# Patient Record
Sex: MALE | Race: WHITE | Employment: FULL TIME | URBAN - METROPOLITAN AREA
[De-identification: names, ages, dates, MRNs, and addresses within clinical notes are randomized per-mention and may not be internally consistent; named-entity substitution may affect disease eponyms.]

---

## 2017-01-05 RX ORDER — INSULIN LISPRO 100 [IU]/ML
INJECTION, SOLUTION INTRAVENOUS; SUBCUTANEOUS
Qty: 30 ML | Refills: 0 | Status: SHIPPED | OUTPATIENT
Start: 2017-01-05 | End: 2017-02-26

## 2017-01-10 ENCOUNTER — TELEPHONE (OUTPATIENT)
Dept: ENDOCRINOLOGY CLINIC | Facility: CLINIC | Age: 32
End: 2017-01-10

## 2017-01-10 NOTE — TELEPHONE ENCOUNTER
Kinga Delcid requesting RN to fax last 2 office notes to [phone]. For add'l questions pls call. Thank you.

## 2017-01-30 ENCOUNTER — TELEPHONE (OUTPATIENT)
Dept: INTERNAL MEDICINE CLINIC | Facility: CLINIC | Age: 32
End: 2017-01-30

## 2017-01-30 NOTE — TELEPHONE ENCOUNTER
Patient had colonoscopy done 7/14/2014 with Dr. Yara Singletary. Recommended to repeat in two years due to family hx (see Dr. Rashida Woods letter to Dr. Tracie Friedman in Media tab). Called patient and relayed message. Verbalized understanding.

## 2017-01-30 NOTE — TELEPHONE ENCOUNTER
Pt had a colonoscopy a couple of years ago, and he doesn't remember who he saw.                Tasked to Nursing

## 2017-01-30 NOTE — TELEPHONE ENCOUNTER
Dr. Bernard Lee, patient is curious about the heart scan he see's advertised through [employer]. He states he wants to be proactive due to his diabetes and is wondering if you thought it'd be ok if he does the testing.  I believe the test includes heart scan to see

## 2017-02-27 RX ORDER — INSULIN LISPRO 100 [IU]/ML
INJECTION, SOLUTION INTRAVENOUS; SUBCUTANEOUS
Qty: 30 ML | Refills: 0 | Status: SHIPPED | OUTPATIENT
Start: 2017-02-27 | End: 2017-04-22

## 2017-03-07 PROBLEM — Z83.719 FAMILY HISTORY OF COLONIC POLYPS: Status: ACTIVE | Noted: 2017-03-07

## 2017-03-07 PROBLEM — Z83.71 FAMILY HISTORY OF COLONIC POLYPS: Status: ACTIVE | Noted: 2017-03-07

## 2017-03-07 PROBLEM — Z80.0 FAMILY HISTORY OF COLON CANCER: Status: ACTIVE | Noted: 2017-03-07

## 2017-03-24 ENCOUNTER — HOSPITAL ENCOUNTER (OUTPATIENT)
Dept: CT IMAGING | Facility: HOSPITAL | Age: 32
Discharge: HOME | End: 2017-03-24
Attending: INTERNAL MEDICINE

## 2017-03-24 VITALS — HEART RATE: 82 BPM | SYSTOLIC BLOOD PRESSURE: 118 MMHG | DIASTOLIC BLOOD PRESSURE: 72 MMHG

## 2017-03-24 DIAGNOSIS — Z13.9 SCREENING FOR CONDITION: ICD-10-CM

## 2017-03-30 ENCOUNTER — TELEPHONE (OUTPATIENT)
Dept: INTERNAL MEDICINE CLINIC | Facility: CLINIC | Age: 32
End: 2017-03-30

## 2017-04-24 RX ORDER — INSULIN LISPRO 100 [IU]/ML
INJECTION, SOLUTION INTRAVENOUS; SUBCUTANEOUS
Qty: 30 ML | Refills: 0 | Status: SHIPPED | OUTPATIENT
Start: 2017-04-24 | End: 2017-05-31

## 2017-05-31 RX ORDER — INSULIN LISPRO 100 [IU]/ML
INJECTION, SOLUTION INTRAVENOUS; SUBCUTANEOUS
Qty: 30 ML | Refills: 0 | OUTPATIENT
Start: 2017-05-31

## 2017-05-31 RX ORDER — INSULIN LISPRO 100 [IU]/ML
INJECTION, SOLUTION INTRAVENOUS; SUBCUTANEOUS
Qty: 30 ML | Refills: 2 | Status: SHIPPED | OUTPATIENT
Start: 2017-05-31 | End: 2017-08-29

## 2017-05-31 NOTE — TELEPHONE ENCOUNTER
FYI - Pt scheduled appt to see [facility] on 8/2/2017 - requesting refills for insulin up until appt. For add'l questions pls call pt. Thank you.       Current Outpatient Prescriptions:  HUMALOG 100 UNIT/ML Subcutaneous Solution INJECT VIA INSULIN PUMP, MAXIMUM O

## 2017-05-31 NOTE — TELEPHONE ENCOUNTER
LOV 1/2016. Letter sent and called patient in February. Still no follow up. Sent 1 additional mychart message today.

## 2017-07-27 ENCOUNTER — TELEPHONE (OUTPATIENT)
Dept: ENDOCRINOLOGY CLINIC | Facility: CLINIC | Age: 32
End: 2017-07-27

## 2017-07-27 NOTE — TELEPHONE ENCOUNTER
WENDY- Spoke with Ana Mcgee. She got call from patient that he has new pump 670G and wanted to schedule training with them. She noted it doesn't look like they trained him in the past and there is no current order.  They aren't able to see him until 8/9 but wanted

## 2017-08-02 ENCOUNTER — OFFICE VISIT (OUTPATIENT)
Dept: ENDOCRINOLOGY CLINIC | Facility: CLINIC | Age: 32
End: 2017-08-02

## 2017-08-02 VITALS
HEIGHT: 70 IN | SYSTOLIC BLOOD PRESSURE: 127 MMHG | WEIGHT: 219 LBS | DIASTOLIC BLOOD PRESSURE: 86 MMHG | HEART RATE: 76 BPM | BODY MASS INDEX: 31.35 KG/M2

## 2017-08-02 DIAGNOSIS — Z79.4 UNCONTROLLED TYPE 2 DIABETES MELLITUS WITH HYPERGLYCEMIA, WITH LONG-TERM CURRENT USE OF INSULIN (HCC): Primary | ICD-10-CM

## 2017-08-02 DIAGNOSIS — E11.65 UNCONTROLLED TYPE 2 DIABETES MELLITUS WITH HYPERGLYCEMIA, WITH LONG-TERM CURRENT USE OF INSULIN (HCC): Primary | ICD-10-CM

## 2017-08-02 LAB
CARTRIDGE LOT#: ABNORMAL NUMERIC
GLUCOSE BLOOD: 159
HEMOGLOBIN A1C: 9.3 % (ref 4.3–5.6)
TEST STRIP LOT #: NORMAL NUMERIC

## 2017-08-02 PROCEDURE — 99214 OFFICE O/P EST MOD 30 MIN: CPT | Performed by: INTERNAL MEDICINE

## 2017-08-02 PROCEDURE — 36416 COLLJ CAPILLARY BLOOD SPEC: CPT | Performed by: INTERNAL MEDICINE

## 2017-08-02 PROCEDURE — 82962 GLUCOSE BLOOD TEST: CPT | Performed by: INTERNAL MEDICINE

## 2017-08-02 PROCEDURE — 83036 HEMOGLOBIN GLYCOSYLATED A1C: CPT | Performed by: INTERNAL MEDICINE

## 2017-08-02 NOTE — PROGRESS NOTES
Name: Hussain Hall  Date: 8/2/2017    Referring Physician: No ref. provider found    HISTORY OF PRESENT ILLNESS   Hussain Hall is a 27year old male who presents for diabetes mellitus.   He now has 1year old boy, 21 month old boy and new baby boy a CONTOUR NEXT TEST) In Vitro Strip, Use to check blood sugar 4 times daily as directed, Disp: 150 each, Rfl: 5  •  aspirin 325 MG Oral Tab, Take 1 tablet by mouth., Disp: , Rfl:   •  Blood Glucose Monitoring Suppl (ACCU-CHEK WALTER PLUS) W/DEVICE Does not ap Pantera Dinero MD;  Location: [address] [place] [address]  2008: LAPAROSCOPIC CHOLECYSTECTOMY      PHYSICAL EXAM  /86 (BP Location: Right arm, Patient Position: Sitting, Cuff Size: large)   Pulse 76   Ht 5' 10\" (1.778 m)   Wt 219 lb (99.3 kg

## 2017-08-29 RX ORDER — INSULIN LISPRO 100 [IU]/ML
INJECTION, SOLUTION INTRAVENOUS; SUBCUTANEOUS
Qty: 30 ML | Refills: 5 | Status: SHIPPED | OUTPATIENT
Start: 2017-08-29 | End: 2017-10-13

## 2017-09-06 ENCOUNTER — HOSPITAL ENCOUNTER (OUTPATIENT)
Dept: ENDOCRINOLOGY | Facility: HOSPITAL | Age: 32
Discharge: HOME | End: 2017-09-06
Attending: INTERNAL MEDICINE
Payer: COMMERCIAL

## 2017-09-06 VITALS — WEIGHT: 227 LBS | BODY MASS INDEX: 33 KG/M2

## 2017-09-06 DIAGNOSIS — E10.65 TYPE 1 DIABETES MELLITUS WITH HYPERGLYCEMIA (HCC): Primary | ICD-10-CM

## 2017-09-06 NOTE — PROGRESS NOTES
Josué Blackwell  : 1985 was seen for Insulin Pump Upgrade: Medtronic 670G with Guardian Sensor    Date: 2017   Start time: 10:10 am End time: 11:30 am    Pump settings:   Basal rate(s): 12MN-1.1 u/hr; 5:30 am-1.4 u/hr; 6:00 pm-1.8 u/hr; 9:00 p

## 2017-09-13 ENCOUNTER — OFFICE VISIT (OUTPATIENT)
Dept: INTERNAL MEDICINE CLINIC | Facility: CLINIC | Age: 32
End: 2017-09-13

## 2017-09-13 VITALS
WEIGHT: 224 LBS | BODY MASS INDEX: 32.07 KG/M2 | TEMPERATURE: 99 F | SYSTOLIC BLOOD PRESSURE: 120 MMHG | DIASTOLIC BLOOD PRESSURE: 78 MMHG | HEART RATE: 83 BPM | OXYGEN SATURATION: 99 % | HEIGHT: 70 IN

## 2017-09-13 DIAGNOSIS — R10.31 PAIN IN THE GROIN, RIGHT: Primary | ICD-10-CM

## 2017-09-13 PROCEDURE — 99213 OFFICE O/P EST LOW 20 MIN: CPT | Performed by: INTERNAL MEDICINE

## 2017-09-13 PROCEDURE — 99212 OFFICE O/P EST SF 10 MIN: CPT | Performed by: INTERNAL MEDICINE

## 2017-09-13 NOTE — PROGRESS NOTES
Antoinette Cruz is a 27year old male. HPI:   1. Pain in the groin, right - about 1 week ago he was playing with his 1 yr old son who accidentally kicked him in the right testicle.  This was very painful and the pain extended into the right lower groin ar NextGen   • Meibomian gland dysfunction 2014    Per NextGen:  \"Meibomian gland dysfunction, OU. \"   • Myopia OU 2014    Per NextGen:  \"Mild Myopia, OU. 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 Call me if not doing well. The patient indicates understanding of these issues and agrees to the plan.   The patient is asked to return in 6 mo or as needed

## 2017-09-20 ENCOUNTER — APPOINTMENT (OUTPATIENT)
Dept: ENDOCRINOLOGY | Facility: HOSPITAL | Age: 32
End: 2017-09-20
Attending: INTERNAL MEDICINE
Payer: COMMERCIAL

## 2017-09-26 ENCOUNTER — HOSPITAL ENCOUNTER (OUTPATIENT)
Dept: ENDOCRINOLOGY | Facility: HOSPITAL | Age: 32
Discharge: HOME | End: 2017-09-26
Attending: INTERNAL MEDICINE
Payer: COMMERCIAL

## 2017-09-26 DIAGNOSIS — E10.65 TYPE 1 DIABETES MELLITUS WITH HYPERGLYCEMIA (HCC): Primary | ICD-10-CM

## 2017-09-26 NOTE — PROGRESS NOTES
Jonathan Champion  : 1985 was seen for Insulin Pump Follow up: Auto Mode start    Date: 2017   Start time: 10:45 am End time: 12:00 pm      Reviewed pump settings: No changes in current pump settings.  Manual mode settings to be adjusted at next

## 2017-10-05 ENCOUNTER — HOSPITAL ENCOUNTER (OUTPATIENT)
Dept: ENDOCRINOLOGY | Facility: HOSPITAL | Age: 32
Discharge: HOME | End: 2017-10-05
Attending: INTERNAL MEDICINE
Payer: COMMERCIAL

## 2017-10-05 DIAGNOSIS — E10.65 TYPE 1 DIABETES MELLITUS WITH HYPERGLYCEMIA (HCC): Primary | ICD-10-CM

## 2017-10-05 NOTE — PROGRESS NOTES
Tres Pleitez  : 1985 was seen for Insulin Pump Follow up for Medtronic 670 G Auto Mode    Date: 10/5/2017   Start time: 9:30 am End time: 10:30 am        Evaluated pump download: Patient has been using Auto Mode since prior visit.  His TDD of in

## 2017-10-13 RX ORDER — INSULIN LISPRO 100 [IU]/ML
INJECTION, SOLUTION INTRAVENOUS; SUBCUTANEOUS
Qty: 30 ML | Refills: 2 | Status: SHIPPED | OUTPATIENT
Start: 2017-10-13 | End: 2017-11-28

## 2017-11-27 ENCOUNTER — TELEPHONE (OUTPATIENT)
Dept: ENDOCRINOLOGY CLINIC | Facility: CLINIC | Age: 32
End: 2017-11-27

## 2017-11-27 NOTE — TELEPHONE ENCOUNTER
Current Outpatient Prescriptions:  HUMALOG 100 UNIT/ML Subcutaneous Solution INJECT VIA INSULIN PUMP, MAXIMUM  UNITS DAILY Disp: 30 mL Rfl: 2     PA request pls call [phone] Pt ID# [id number]

## 2017-11-28 RX ORDER — INSULIN ASPART 100 [IU]/ML
INJECTION, SOLUTION INTRAVENOUS; SUBCUTANEOUS
Qty: 30 ML | Refills: 5 | Status: SHIPPED | OUTPATIENT
Start: 2017-11-28 | End: 2018-11-23

## 2017-11-28 NOTE — TELEPHONE ENCOUNTER
LOV 8/2/2017. Spoke with insurance and Novolog is preferred with no PA. Called patient and he has used Novolog in pens before and is ok to switch. Changed to formulary preferred at same doses per [facility] protocol and sent to pharmacy.

## 2018-01-08 RX ORDER — ALPRAZOLAM 0.25 MG/1
0.25 TABLET ORAL AS NEEDED
Qty: 5 TABLET | Refills: 0 | COMMUNITY
Start: 2018-01-08 | End: 2018-02-08

## 2018-02-08 NOTE — TELEPHONE ENCOUNTER
To MD:  The above refill request is for a controlled substance. Please indicate yes or no to refill 30 days supply plus one refill. If more refills are appropriate, please indicate quantity  To DR. MORALES

## 2018-02-08 NOTE — TELEPHONE ENCOUNTER
Pt is requesting refill Alprazolam 0.25 mg, pt has an jeanne for a phyiscal on 2/20  Pt will be flying on 2/11 and needs a refill for this flight pt will be using  [facility] [address]  Tasked to rx low

## 2018-02-09 RX ORDER — ALPRAZOLAM 0.25 MG/1
0.25 TABLET ORAL AS NEEDED
Qty: 5 TABLET | Refills: 0 | COMMUNITY
Start: 2018-02-09 | End: 2018-02-20

## 2018-02-09 RX ORDER — ALPRAZOLAM 0.25 MG/1
TABLET ORAL
Qty: 5 TABLET | Refills: 0 | OUTPATIENT
Start: 2018-02-09

## 2018-02-20 ENCOUNTER — OFFICE VISIT (OUTPATIENT)
Dept: INTERNAL MEDICINE CLINIC | Facility: CLINIC | Age: 33
End: 2018-02-20

## 2018-02-20 VITALS
HEIGHT: 69 IN | BODY MASS INDEX: 33.03 KG/M2 | HEART RATE: 83 BPM | DIASTOLIC BLOOD PRESSURE: 86 MMHG | TEMPERATURE: 99 F | OXYGEN SATURATION: 97 % | WEIGHT: 223 LBS | SYSTOLIC BLOOD PRESSURE: 112 MMHG

## 2018-02-20 DIAGNOSIS — Z80.0 FAMILY HISTORY OF COLON CANCER: ICD-10-CM

## 2018-02-20 DIAGNOSIS — E10.65 TYPE 1 DIABETES MELLITUS WITH HYPERGLYCEMIA (HCC): Primary | ICD-10-CM

## 2018-02-20 DIAGNOSIS — R10.31 PAIN IN THE GROIN, RIGHT: ICD-10-CM

## 2018-02-20 PROCEDURE — 99395 PREV VISIT EST AGE 18-39: CPT | Performed by: INTERNAL MEDICINE

## 2018-02-20 RX ORDER — ALPRAZOLAM 0.25 MG/1
0.25 TABLET ORAL AS NEEDED
Qty: 30 TABLET | Refills: 0 | Status: SHIPPED | OUTPATIENT
Start: 2018-02-20 | End: 2019-03-03

## 2018-02-20 NOTE — PROGRESS NOTES
Jonathan Champion is a 27year old male. HPI:   German Miranda is here for an annual wellness exam. He has been doing well, working daily, no major issues or complaints, no ED or UC visit. He has good energy and good appetite.      He has a new insulin pump and th • Excessive blinking 2014    Per NextGen:  \"Excessive blinking, OU. \"   • Lipid screening 02-    Per NextGen   • Meibomian gland dysfunction 2014    Per NextGen:  \"Meibomian gland dysfunction, OU. \"   • Myopia OU 2014    Per NextGen:  \"Mild Sudeep hyperglycemia ([facility] [address]  He follows with Dr Nohemy Jimenes - he has been doing well    2. Family history of colon cancer  He is up to date with colonoscopy    3.  Pain in the groin, right  Gone     We discussed optimal weight and he should shoot for about 200 lbs (20 lb

## 2018-06-19 ENCOUNTER — TELEPHONE (OUTPATIENT)
Dept: ENDOCRINOLOGY CLINIC | Facility: CLINIC | Age: 33
End: 2018-06-19

## 2018-06-19 NOTE — TELEPHONE ENCOUNTER
Usually supplies are received by calling for refill from medtronic. Called the patient. He states that he has called [employer] several times for a refill and they keep telling him that they are calling the doctors office with no response.  No calls have bee

## 2018-06-28 NOTE — TELEPHONE ENCOUNTER
Order form received. Signed by [facility] and faxed to [employer].

## 2018-07-03 NOTE — TELEPHONE ENCOUNTER
Current Outpatient Prescriptions:    Pt requesting new RX for Accu Chek Lancets not on med list pls advise

## 2018-07-05 RX ORDER — LANCETS
EACH MISCELLANEOUS
Qty: 200 EACH | Refills: 0 | Status: SHIPPED | OUTPATIENT
Start: 2018-07-05 | End: 2018-11-18

## 2018-07-09 NOTE — TELEPHONE ENCOUNTER
Pt calling to advise that he is completely out of sensors, indicates rx: [facility] request was not received/sent, pls call at:[phone],thanks.

## 2018-08-15 ENCOUNTER — OFFICE VISIT (OUTPATIENT)
Dept: ENDOCRINOLOGY CLINIC | Facility: CLINIC | Age: 33
End: 2018-08-15
Payer: COMMERCIAL

## 2018-08-15 VITALS
SYSTOLIC BLOOD PRESSURE: 120 MMHG | BODY MASS INDEX: 31 KG/M2 | HEART RATE: 89 BPM | DIASTOLIC BLOOD PRESSURE: 85 MMHG | WEIGHT: 211 LBS

## 2018-08-15 DIAGNOSIS — IMO0001 UNCONTROLLED TYPE 1 DIABETES MELLITUS WITHOUT COMPLICATION: Primary | ICD-10-CM

## 2018-08-15 LAB
CARTRIDGE LOT#: ABNORMAL NUMERIC
GLUCOSE BLOOD: 127
HEMOGLOBIN A1C: 7.2 % (ref 4.3–5.6)
TEST STRIP LOT #: NORMAL NUMERIC

## 2018-08-15 PROCEDURE — 95251 CONT GLUC MNTR ANALYSIS I&R: CPT | Performed by: INTERNAL MEDICINE

## 2018-08-15 PROCEDURE — 83036 HEMOGLOBIN GLYCOSYLATED A1C: CPT | Performed by: INTERNAL MEDICINE

## 2018-08-15 PROCEDURE — 36416 COLLJ CAPILLARY BLOOD SPEC: CPT | Performed by: INTERNAL MEDICINE

## 2018-08-15 PROCEDURE — 82962 GLUCOSE BLOOD TEST: CPT | Performed by: INTERNAL MEDICINE

## 2018-08-15 PROCEDURE — 99214 OFFICE O/P EST MOD 30 MIN: CPT | Performed by: INTERNAL MEDICINE

## 2018-08-15 PROCEDURE — 99212 OFFICE O/P EST SF 10 MIN: CPT | Performed by: INTERNAL MEDICINE

## 2018-08-15 NOTE — PROGRESS NOTES
Name: Danny Buckley  Date: 8/15/2018    Referring Physician: No ref. provider found    HISTORY OF PRESENT ILLNESS   Danny Buckley is a 26year old male who presents for diabetes mellitus.         Since last visit he has been started on 670G and [place] TEST) In Vitro Strip, Use to check blood sugar 4 times daily as directed, Disp: 150 each, Rfl: 5  •  aspirin 325 MG Oral Tab, Take 1 tablet by mouth daily.   , Disp: , Rfl:   •  Blood Glucose Monitoring Suppl (ACCU-CHEK WALTER PLUS) W/DEVICE Does not apply K Kwasi Trinh MD;  Location: [address] [place] [address]  2008: LAPAROSCOPIC CHOLECYSTECTOMY      PHYSICAL EXAM  /85   Pulse 89   Wt 211 lb (95.7 kg)   BMI 31.16 kg/m²     General Appearance:  alert, well developed, in evaluation. As a result of his testing his medication was adjusted as noted above. This is a 25 minute visit and greater than 50% of the time was spent counseling the patient and/or coordinating care.     RTC 6 months      Orders Placed This Encounter

## 2018-09-26 ENCOUNTER — TELEPHONE (OUTPATIENT)
Dept: ENDOCRINOLOGY CLINIC | Facility: CLINIC | Age: 33
End: 2018-09-26

## 2018-09-26 NOTE — TELEPHONE ENCOUNTER
Sayra from [place] is calling to request the following   LOV notes, hx physical, treatment plan , lab reports , blood sugar log if any       Please call thank you [phone]  deuce [phone]

## 2018-11-19 RX ORDER — LANCETS
EACH MISCELLANEOUS
Qty: 200 EACH | Refills: 2 | Status: SHIPPED | OUTPATIENT
Start: 2018-11-19

## 2018-11-23 ENCOUNTER — TELEPHONE (OUTPATIENT)
Dept: ENDOCRINOLOGY CLINIC | Facility: CLINIC | Age: 33
End: 2018-11-23

## 2018-11-23 RX ORDER — INSULIN ASPART 100 [IU]/ML
INJECTION, SOLUTION INTRAVENOUS; SUBCUTANEOUS
Qty: 30 ML | Refills: 0 | Status: CANCELLED | OUTPATIENT
Start: 2018-11-23

## 2018-11-23 RX ORDER — INSULIN ASPART 100 [IU]/ML
INJECTION, SOLUTION INTRAVENOUS; SUBCUTANEOUS
Qty: 30 ML | Refills: 5 | Status: SHIPPED | OUTPATIENT
Start: 2018-11-23 | End: 2018-11-28

## 2018-11-23 NOTE — TELEPHONE ENCOUNTER
Pharmacy requesting refill for Rx Booker Sides). Thank you           Current Outpatient Medications:     •  insulin aspart (NOVOLOG) 100 UNIT/ML Subcutaneous Solution, Inject via insulin pump.  Maximum 100 units daily, Disp: 30 mL, Rfl: 5

## 2018-11-28 RX ORDER — INSULIN ASPART 100 [IU]/ML
INJECTION, SOLUTION INTRAVENOUS; SUBCUTANEOUS
Qty: 30 ML | Refills: 5 | Status: SHIPPED | OUTPATIENT
Start: 2018-11-28 | End: 2019-09-14

## 2018-11-28 NOTE — TELEPHONE ENCOUNTER
LOV 8/15/2018. Prescription was approved on 11/23. Sent again as already approved to correct pharmacy per patient.

## 2018-11-28 NOTE — TELEPHONE ENCOUNTER
Pt requesting new script to be sent to updated preferred Pharm:[facility] ins no longer covers the other [facility] for rx:Novolog, pls call pt at:[phone],thanks.

## 2019-01-02 ENCOUNTER — TELEPHONE (OUTPATIENT)
Dept: INTERNAL MEDICINE CLINIC | Facility: CLINIC | Age: 34
End: 2019-01-02

## 2019-01-02 NOTE — TELEPHONE ENCOUNTER
Pt requesting order for a colonoscopy with Dr Amanda Wright  Please call pt when order in place  Tasked to nursing

## 2019-01-02 NOTE — TELEPHONE ENCOUNTER
Called patient and explained that he does not need referral for Colonoscopy ( has [employer] PPO) - verbalized understanding

## 2019-03-03 ENCOUNTER — TELEPHONE (OUTPATIENT)
Dept: INTERNAL MEDICINE CLINIC | Facility: CLINIC | Age: 34
End: 2019-03-03

## 2019-03-04 RX ORDER — ALPRAZOLAM 0.25 MG/1
TABLET ORAL
Qty: 30 TABLET | Refills: 0 | Status: SHIPPED
Start: 2019-03-04 | End: 2021-07-26

## 2019-03-04 NOTE — TELEPHONE ENCOUNTER
Prescription faxed to [facility] at [phone]. Fax confirmation received and sent to scanning.

## 2019-03-22 ENCOUNTER — TELEPHONE (OUTPATIENT)
Dept: ENDOCRINOLOGY CLINIC | Facility: CLINIC | Age: 34
End: 2019-03-22

## 2019-03-22 NOTE — TELEPHONE ENCOUNTER
Nhi/[facility] appeal dept requesting a call back regarding medical supplies possibly diabetic supplies.  Please call thank you [phone]

## 2019-03-26 NOTE — TELEPHONE ENCOUNTER
Attempted to return call to Arlet Lundy at [place]. No direct line or reference number was left. Called number but goes to main provider menu. Discussed with representative in medical but they were unable to locate note on previous call. Unclear what is needed.  The

## 2019-05-10 ENCOUNTER — TELEPHONE (OUTPATIENT)
Dept: ENDOCRINOLOGY CLINIC | Facility: CLINIC | Age: 34
End: 2019-05-10

## 2019-05-15 ENCOUNTER — TELEPHONE (OUTPATIENT)
Dept: INTERNAL MEDICINE CLINIC | Facility: CLINIC | Age: 34
End: 2019-05-15

## 2019-05-15 RX ORDER — SULFACETAMIDE SODIUM 100 MG/ML
2 SOLUTION/ DROPS OPHTHALMIC 3 TIMES DAILY
Qty: 1 BOTTLE | Refills: 0 | Status: CANCELLED | OUTPATIENT
Start: 2019-05-15

## 2019-05-15 RX ORDER — CIPROFLOXACIN HYDROCHLORIDE 3.5 MG/ML
1 SOLUTION/ DROPS TOPICAL 3 TIMES DAILY
Qty: 1 BOTTLE | Refills: 0 | Status: SHIPPED | OUTPATIENT
Start: 2019-05-15 | End: 2020-10-20 | Stop reason: ALTCHOICE

## 2019-05-15 NOTE — TELEPHONE ENCOUNTER
Pt. States his son has the pink eye and he would like to be prescribed meds so he doesn't catch it please advise ph. # [phone]  Routed to clinical  [facility] ph.  # [phone]

## 2019-05-15 NOTE — TELEPHONE ENCOUNTER
Pt denies any acute sx of pink eye. However, reports itching to both eyes that started today. No redness or drainage. Wants something to treat prophylactically.      To Dr MORALES: edgardo/nicole reyes

## 2019-08-27 ENCOUNTER — TELEPHONE (OUTPATIENT)
Dept: ENDOCRINOLOGY CLINIC | Facility: CLINIC | Age: 34
End: 2019-08-27

## 2019-08-27 NOTE — TELEPHONE ENCOUNTER
Received form from [employer] for pump supplies. Pt is due for apt. Called pt to schedule. Pt scheduled apt for 9/11/19 in [place] 11:15am. Will fax form to [employer].

## 2019-09-11 ENCOUNTER — OFFICE VISIT (OUTPATIENT)
Dept: ENDOCRINOLOGY CLINIC | Facility: CLINIC | Age: 34
End: 2019-09-11
Payer: COMMERCIAL

## 2019-09-11 ENCOUNTER — APPOINTMENT (OUTPATIENT)
Dept: LAB | Age: 34
End: 2019-09-11
Attending: INTERNAL MEDICINE
Payer: COMMERCIAL

## 2019-09-11 VITALS
HEART RATE: 73 BPM | WEIGHT: 207 LBS | BODY MASS INDEX: 30 KG/M2 | DIASTOLIC BLOOD PRESSURE: 85 MMHG | SYSTOLIC BLOOD PRESSURE: 128 MMHG

## 2019-09-11 DIAGNOSIS — E10.9 CONTROLLED DIABETES MELLITUS TYPE 1 WITHOUT COMPLICATIONS (HCC): ICD-10-CM

## 2019-09-11 DIAGNOSIS — E10.9 CONTROLLED DIABETES MELLITUS TYPE 1 WITHOUT COMPLICATIONS (HCC): Primary | ICD-10-CM

## 2019-09-11 LAB
ALBUMIN SERPL-MCNC: 4.2 G/DL (ref 3.4–5)
ALBUMIN/GLOB SERPL: 1.1 {RATIO} (ref 1–2)
ALP LIVER SERPL-CCNC: 122 U/L (ref 45–117)
ALT SERPL-CCNC: 22 U/L (ref 16–61)
ANION GAP SERPL CALC-SCNC: 4 MMOL/L (ref 0–18)
AST SERPL-CCNC: 16 U/L (ref 15–37)
BILIRUB SERPL-MCNC: 0.9 MG/DL (ref 0.1–2)
BUN BLD-MCNC: 17 MG/DL (ref 7–18)
BUN/CREAT SERPL: 19.3 (ref 10–20)
CALCIUM BLD-MCNC: 9.4 MG/DL (ref 8.5–10.1)
CARTRIDGE LOT#: ABNORMAL NUMERIC
CHLORIDE SERPL-SCNC: 105 MMOL/L (ref 98–112)
CO2 SERPL-SCNC: 32 MMOL/L (ref 21–32)
CREAT BLD-MCNC: 0.88 MG/DL (ref 0.7–1.3)
CREAT UR-SCNC: 24.7 MG/DL
GLOBULIN PLAS-MCNC: 3.7 G/DL (ref 2.8–4.4)
GLUCOSE BLD-MCNC: 137 MG/DL (ref 70–99)
GLUCOSE BLOOD: 228
HEMOGLOBIN A1C: 7 % (ref 4.3–5.6)
LDLC SERPL DIRECT ASSAY-MCNC: 68 MG/DL (ref ?–100)
M PROTEIN MFR SERPL ELPH: 7.9 G/DL (ref 6.4–8.2)
MICROALBUMIN UR-MCNC: <0.5 MG/DL
OSMOLALITY SERPL CALC.SUM OF ELEC: 296 MOSM/KG (ref 275–295)
PATIENT FASTING: NO
POTASSIUM SERPL-SCNC: 4.5 MMOL/L (ref 3.5–5.1)
SODIUM SERPL-SCNC: 141 MMOL/L (ref 136–145)
T4 FREE SERPL-MCNC: 1 NG/DL (ref 0.8–1.7)
TEST STRIP LOT #: NORMAL NUMERIC
TSI SER-ACNC: 0.67 MIU/ML (ref 0.36–3.74)

## 2019-09-11 PROCEDURE — 82570 ASSAY OF URINE CREATININE: CPT

## 2019-09-11 PROCEDURE — 83036 HEMOGLOBIN GLYCOSYLATED A1C: CPT | Performed by: INTERNAL MEDICINE

## 2019-09-11 PROCEDURE — 95251 CONT GLUC MNTR ANALYSIS I&R: CPT | Performed by: INTERNAL MEDICINE

## 2019-09-11 PROCEDURE — 36416 COLLJ CAPILLARY BLOOD SPEC: CPT | Performed by: INTERNAL MEDICINE

## 2019-09-11 PROCEDURE — 84443 ASSAY THYROID STIM HORMONE: CPT

## 2019-09-11 PROCEDURE — 36415 COLL VENOUS BLD VENIPUNCTURE: CPT

## 2019-09-11 PROCEDURE — 80053 COMPREHEN METABOLIC PANEL: CPT

## 2019-09-11 PROCEDURE — 99214 OFFICE O/P EST MOD 30 MIN: CPT | Performed by: INTERNAL MEDICINE

## 2019-09-11 PROCEDURE — 83721 ASSAY OF BLOOD LIPOPROTEIN: CPT

## 2019-09-11 PROCEDURE — 82043 UR ALBUMIN QUANTITATIVE: CPT

## 2019-09-11 PROCEDURE — 84439 ASSAY OF FREE THYROXINE: CPT

## 2019-09-11 NOTE — PROGRESS NOTES
Name: Ramona Sim  Date: 9/11/2019    Referring Physician: No ref. provider found    HISTORY OF PRESENT ILLNESS   Ramona Sim is a 37year old male who presents for diabetes mellitus.         Since last visit he has been started on 670G and [place] CONTOUR NEXT TEST) In Vitro Strip, Use to check blood sugar 4 times daily as directed, Disp: 150 each, Rfl: 5  •  aspirin 325 MG Oral Tab, Take 1 tablet by mouth daily.   , Disp: , Rfl:   •  Blood Glucose Monitoring Suppl (ACCU-CHEK WALTER PLUS) W/DEVICE [place] 4/30/2019    Performed by Henrik Mann MD at [address] [place] [address], [facility], POSSIBLE POLYPECTOMY [address] N/A 3/7/2017    Performed by Henrik Mann MD at [address] [address] evaluated for the two weeks prior to visit and demonstrated overall well controlled BG levels. He did not experience any hypoglycemia during the week of evaluation. As a result of his testing his medication was adjusted as noted above.      This is a 22 m

## 2019-09-16 RX ORDER — INSULIN ASPART 100 [IU]/ML
INJECTION, SOLUTION INTRAVENOUS; SUBCUTANEOUS
Qty: 30 ML | Refills: 3 | Status: SHIPPED | OUTPATIENT
Start: 2019-09-16 | End: 2020-05-12

## 2020-01-31 ENCOUNTER — OFFICE VISIT (OUTPATIENT)
Dept: ENDOCRINOLOGY CLINIC | Facility: CLINIC | Age: 35
End: 2020-01-31
Payer: COMMERCIAL

## 2020-01-31 VITALS
HEART RATE: 83 BPM | SYSTOLIC BLOOD PRESSURE: 123 MMHG | WEIGHT: 210 LBS | BODY MASS INDEX: 30 KG/M2 | DIASTOLIC BLOOD PRESSURE: 75 MMHG

## 2020-01-31 DIAGNOSIS — E10.9 CONTROLLED DIABETES MELLITUS TYPE 1 WITHOUT COMPLICATIONS (HCC): Primary | ICD-10-CM

## 2020-01-31 LAB
CARTRIDGE LOT#: ABNORMAL NUMERIC
GLUCOSE BLOOD: 193
HEMOGLOBIN A1C: 7 % (ref 4.3–5.6)
TEST STRIP LOT #: NORMAL NUMERIC

## 2020-01-31 PROCEDURE — 36416 COLLJ CAPILLARY BLOOD SPEC: CPT | Performed by: INTERNAL MEDICINE

## 2020-01-31 PROCEDURE — 95251 CONT GLUC MNTR ANALYSIS I&R: CPT | Performed by: INTERNAL MEDICINE

## 2020-01-31 PROCEDURE — 83036 HEMOGLOBIN GLYCOSYLATED A1C: CPT | Performed by: INTERNAL MEDICINE

## 2020-01-31 PROCEDURE — 99214 OFFICE O/P EST MOD 30 MIN: CPT | Performed by: INTERNAL MEDICINE

## 2020-01-31 NOTE — PROGRESS NOTES
Name: López Saldana  Date: 1/31/2020    Referring Physician: No ref. provider found    HISTORY OF PRESENT ILLNESS   López Saldana is a 37year old male who presents for diabetes mellitus.         Since last visit he has been started on 670G and [place] Blood (DOC CONTOUR NEXT TEST) In Vitro Strip, Use to check blood sugar 4 times daily as directed, Disp: 150 each, Rfl: 5  •  aspirin 325 MG Oral Tab, Take 1 tablet by mouth daily.   , Disp: , Rfl:   •  Blood Glucose Monitoring Suppl (ACCU-CHEK WALTER PLUS) [address] N/A 4/30/2019    Performed by Elizabeth Anand MD at [address], POSSIBLE BIOPSY, POSSIBLE POLYPECTOMY [address] N/A 3/7/2017    Performed by Elizabeth Anand MD at [facility] personal [employer]lr CGM. The blood glucose tracings were evaluated for the two weeks prior to visit and demonstrated overall well controlled BG levels. He did not experience any hypoglycemia during the week of evaluation.   As a result of his testing his m

## 2020-03-04 RX ORDER — BLOOD-GLUCOSE TRANSMITTER
EACH MISCELLANEOUS
Qty: 1 EACH | Refills: 3 | Status: SHIPPED | OUTPATIENT
Start: 2020-03-04 | End: 2020-03-09

## 2020-03-04 RX ORDER — BLOOD-GLUCOSE,RECEIVER,CONT
EACH MISCELLANEOUS
Qty: 1 DEVICE | Refills: 0 | Status: SHIPPED | OUTPATIENT
Start: 2020-03-04 | End: 2020-03-09

## 2020-03-04 RX ORDER — BLOOD-GLUCOSE SENSOR
EACH MISCELLANEOUS
Qty: 1 EACH | Refills: 11 | Status: SHIPPED | OUTPATIENT
Start: 2020-03-04 | End: 2020-03-09

## 2020-03-04 NOTE — TELEPHONE ENCOUNTER
Received fax from [facility] representative). Patient requesting Dexcom G6 supplies (, transmitter, sensors) to pharmacy on file. Pended for provider. LOV 1/31/2020, RTC in 6 months.

## 2020-03-09 ENCOUNTER — TELEPHONE (OUTPATIENT)
Dept: ENDOCRINOLOGY CLINIC | Facility: CLINIC | Age: 35
End: 2020-03-09

## 2020-03-09 RX ORDER — BLOOD-GLUCOSE,RECEIVER,CONT
EACH MISCELLANEOUS
Qty: 1 DEVICE | Refills: 0 | Status: SHIPPED | OUTPATIENT
Start: 2020-03-09 | End: 2020-04-28

## 2020-03-09 RX ORDER — BLOOD-GLUCOSE TRANSMITTER
EACH MISCELLANEOUS
Qty: 1 EACH | Refills: 3 | Status: SHIPPED | OUTPATIENT
Start: 2020-03-09 | End: 2020-04-28

## 2020-03-09 RX ORDER — BLOOD-GLUCOSE SENSOR
EACH MISCELLANEOUS
Qty: 1 EACH | Refills: 11 | Status: SHIPPED | OUTPATIENT
Start: 2020-03-09 | End: 2020-04-28

## 2020-04-03 ENCOUNTER — TELEPHONE (OUTPATIENT)
Dept: ENDOCRINOLOGY CLINIC | Facility: CLINIC | Age: 35
End: 2020-04-03

## 2020-04-03 DIAGNOSIS — E10.9 CONTROLLED DIABETES MELLITUS TYPE 1 WITHOUT COMPLICATIONS (HCC): Primary | ICD-10-CM

## 2020-04-03 NOTE — TELEPHONE ENCOUNTER
Dr. Jewel NGUYEN    Patient was contacted and he was concerned about COVID-19 since he has a new born and wanting additional information. He said he is also following CDC guidelines on how to protect himself.   He wanted to know if there's something he can those showing severe symptoms. RN advised to continue what he is currently doing right now (hand washing, gloves, sanitizing everything) and to continue to follow CDC's guidelines as he is already doing.     Patient voiced understanding and denied further

## 2020-04-28 ENCOUNTER — TELEPHONE (OUTPATIENT)
Dept: ENDOCRINOLOGY CLINIC | Facility: CLINIC | Age: 35
End: 2020-04-28

## 2020-04-28 NOTE — TELEPHONE ENCOUNTER
Dr Joseph Lloyd Bring I forwarded rx for you to [address] [place] [address]  On 4/28 please.  So I can faxt to [address] [place] [address]

## 2020-04-28 NOTE — TELEPHONE ENCOUNTER
Patient was contacted and informed him that forms were received and will be reviewed by Dr. Kimberly Pederson upon her return tomorrow. Dr. Kimberly Pederson,     Received form from [employer] and placed on your desk for review and signature. Thank you.

## 2020-04-28 NOTE — TELEPHONE ENCOUNTER
Patient checking to see if Dr Farhat Delaney office received forms from [employer] for new insulin pump - sent by [email or web address]  Please call. Thank you.

## 2020-04-28 NOTE — TELEPHONE ENCOUNTER
Mansi/Cristian requesting clinical notes and written detailed order for [facility] transmitter, sensor and .       Fax: [phone]

## 2020-04-29 RX ORDER — BLOOD-GLUCOSE,RECEIVER,CONT
EACH MISCELLANEOUS
Qty: 1 DEVICE | Refills: 0 | Status: SHIPPED | OUTPATIENT
Start: 2020-04-29

## 2020-04-29 RX ORDER — BLOOD-GLUCOSE SENSOR
EACH MISCELLANEOUS
Qty: 1 EACH | Refills: 11 | Status: SHIPPED | OUTPATIENT
Start: 2020-04-29

## 2020-04-29 RX ORDER — BLOOD-GLUCOSE TRANSMITTER
EACH MISCELLANEOUS
Qty: 1 EACH | Refills: 3 | Status: SHIPPED | OUTPATIENT
Start: 2020-04-29

## 2020-04-29 NOTE — TELEPHONE ENCOUNTER
Forms signed and sent to [facility] and [facility].

## 2020-05-12 RX ORDER — INSULIN ASPART 100 [IU]/ML
INJECTION, SOLUTION INTRAVENOUS; SUBCUTANEOUS
Qty: 30 ML | Refills: 3 | Status: SHIPPED | OUTPATIENT
Start: 2020-05-12 | End: 2020-11-02

## 2020-06-02 ENCOUNTER — TELEPHONE (OUTPATIENT)
Dept: ENDOCRINOLOGY CLINIC | Facility: CLINIC | Age: 35
End: 2020-06-02

## 2020-06-02 NOTE — TELEPHONE ENCOUNTER
Jocelyn Rajan from [facility] emailed RN regarding getting a pump order. Patient scheduled today for training. RN explained to Jocelyn Rajan that Dr. Lisette Fernandez is out of the office and form will be signed tomorrow and faxed then.     Dr. Lisette Fernandez -- pump order form placed on your

## 2020-06-22 ENCOUNTER — TELEPHONE (OUTPATIENT)
Dept: ENDOCRINOLOGY CLINIC | Facility: CLINIC | Age: 35
End: 2020-06-22

## 2020-06-22 NOTE — TELEPHONE ENCOUNTER
Request received for LOV note for continued coverage on Medtronic supplies. LOV note faxed as requested to [phone].

## 2020-07-06 ENCOUNTER — TELEPHONE (OUTPATIENT)
Dept: INTERNAL MEDICINE CLINIC | Facility: CLINIC | Age: 35
End: 2020-07-06

## 2020-07-06 NOTE — TELEPHONE ENCOUNTER
Pt requesting recommendation from Dr Jonah Tyson for who pt should see for vasectomy  Tasked to nursing

## 2020-09-04 ENCOUNTER — TELEPHONE (OUTPATIENT)
Dept: ENDOCRINOLOGY CLINIC | Facility: CLINIC | Age: 35
End: 2020-09-04

## 2020-09-04 NOTE — TELEPHONE ENCOUNTER
Received fax from [employer] requesting chart notes from 3/05/20 for patients insurance. Provided most recent chart notes and faxed back.

## 2020-10-20 ENCOUNTER — OFFICE VISIT (OUTPATIENT)
Dept: INTERNAL MEDICINE CLINIC | Facility: CLINIC | Age: 35
End: 2020-10-20
Payer: COMMERCIAL

## 2020-10-20 VITALS
SYSTOLIC BLOOD PRESSURE: 114 MMHG | HEART RATE: 73 BPM | OXYGEN SATURATION: 98 % | TEMPERATURE: 98 F | WEIGHT: 211 LBS | BODY MASS INDEX: 30.21 KG/M2 | DIASTOLIC BLOOD PRESSURE: 66 MMHG | HEIGHT: 70 IN

## 2020-10-20 DIAGNOSIS — E78.00 HYPERCHOLESTEREMIA: Primary | ICD-10-CM

## 2020-10-20 DIAGNOSIS — E13.9 DIABETES 1.5, MANAGED AS TYPE 1 (HCC): ICD-10-CM

## 2020-10-20 PROBLEM — H83.03 LABYRINTHITIS OF BOTH EARS: Status: ACTIVE | Noted: 2020-10-20

## 2020-10-20 PROCEDURE — 3078F DIAST BP <80 MM HG: CPT | Performed by: INTERNAL MEDICINE

## 2020-10-20 PROCEDURE — 99214 OFFICE O/P EST MOD 30 MIN: CPT | Performed by: INTERNAL MEDICINE

## 2020-10-20 PROCEDURE — 3008F BODY MASS INDEX DOCD: CPT | Performed by: INTERNAL MEDICINE

## 2020-10-20 PROCEDURE — 3074F SYST BP LT 130 MM HG: CPT | Performed by: INTERNAL MEDICINE

## 2020-10-20 RX ORDER — MECLIZINE HCL 12.5 MG/1
TABLET ORAL
Qty: 30 TABLET | Refills: 3 | Status: SHIPPED | OUTPATIENT
Start: 2020-10-20 | End: 2020-11-23 | Stop reason: ALTCHOICE

## 2020-10-20 RX ORDER — ONDANSETRON 4 MG/1
4 TABLET, FILM COATED ORAL EVERY 8 HOURS PRN
Qty: 20 TABLET | Refills: 2 | Status: SHIPPED | OUTPATIENT
Start: 2020-10-20 | End: 2020-11-23 | Stop reason: ALTCHOICE

## 2020-10-20 NOTE — PROGRESS NOTES
Timo Ceballos is a 38year old male. HPI:   He became lightheaded last night at about 10 PM.  This was not a hypoglycemic episode. He went to sleep and had some dizziness when he would roll in certain positions.   The following morning he noted some pe FASTCLIX LANCETS Does not apply Misc CHECK SUGAR FOUR TIMES DAILY AS DIRECTED 200 each 2   • ONETOUCH ULTRA BLUE In Vitro Strip CHECK BLOOD GLUCOSE 3 TIMES DAILY AS DIRECTED 200 strip 2   • Glucose Blood (DOC CONTOUR NEXT TEST) In Vitro Strip Use to [facility] distress  SKIN: no rashes,no suspicious lesions  HEENT: atraumatic, normocephalic,ears and throat are clear  NECK: supple,no adenopathy,no bruits  LUNGS: clear to auscultation  CARDIO: RRR without murmur  GI: good BS's,no masses, HSM or tenderness  EXTREMI

## 2020-10-21 ENCOUNTER — HOSPITAL ENCOUNTER (EMERGENCY)
Facility: HOSPITAL | Age: 35
Discharge: HOME | End: 2020-10-21
Attending: EMERGENCY MEDICINE
Payer: COMMERCIAL

## 2020-10-21 VITALS
OXYGEN SATURATION: 98 % | BODY MASS INDEX: 30.21 KG/M2 | HEART RATE: 61 BPM | DIASTOLIC BLOOD PRESSURE: 72 MMHG | WEIGHT: 211 LBS | RESPIRATION RATE: 18 BRPM | TEMPERATURE: 99 F | HEIGHT: 70 IN | SYSTOLIC BLOOD PRESSURE: 116 MMHG

## 2020-10-21 DIAGNOSIS — G51.0 BELL'S PALSY: Primary | ICD-10-CM

## 2020-10-21 PROCEDURE — 99283 EMERGENCY DEPT VISIT LOW MDM: CPT

## 2020-10-21 PROCEDURE — 82962 GLUCOSE BLOOD TEST: CPT

## 2020-10-21 RX ORDER — VALACYCLOVIR HYDROCHLORIDE 1 G/1
1 TABLET, FILM COATED ORAL 3 TIMES DAILY
Qty: 21 TABLET | Refills: 0 | Status: SHIPPED | OUTPATIENT
Start: 2020-10-21 | End: 2020-10-28 | Stop reason: ALTCHOICE

## 2020-10-21 RX ORDER — PREDNISONE 20 MG/1
60 TABLET ORAL DAILY
Qty: 21 TABLET | Refills: 0 | Status: SHIPPED | OUTPATIENT
Start: 2020-10-21 | End: 2020-10-28 | Stop reason: ALTCHOICE

## 2020-10-21 NOTE — ED PROVIDER NOTES
Patient Seen in: [facility] Emergency Department    History   No chief complaint on file. Stated Complaint: facial droop    HPI    Patient complains of left  Facial droop that began at 0830.   It involves the entirety of the left face and he fee mg total) by mouth daily for 7 days. Meclizine HCl 12.5 MG Oral Tab,  Take one every 4 hours as needed for dizziness. Ondansetron HCl (ZOFRAN) 4 mg tablet,  Take 1 tablet (4 mg total) by mouth every 8 (eight) hours as needed for Nausea.    insulin aspar drinks      Types: 2 Standard drinks or equivalent per week    Drug use: No      Review of Systems    Positive for stated complaint: facial droop  Other systems are as noted in HPI. Constitutional and vital signs reviewed.       All other systems reviewed precautions given for progression of worsening of his symptoms, development of those extremities, chest pain or dyspnea, fever and he is comfortable with this plan.         Disposition and Plan     Clinical Impression:  Bell's palsy  (primary encounter diag

## 2020-10-21 NOTE — ED INITIAL ASSESSMENT (HPI)
Pt reports he had vertigo yesterday then this morning he noticed left facial droop around 830 this morning. Pt states the vertigo has calmed down.  No other neuro abnormality noted in triage

## 2020-10-22 ENCOUNTER — TELEPHONE (OUTPATIENT)
Dept: INTERNAL MEDICINE CLINIC | Facility: CLINIC | Age: 35
End: 2020-10-22

## 2020-10-22 DIAGNOSIS — G51.0 BELL'S PALSY: Primary | ICD-10-CM

## 2020-10-22 NOTE — TELEPHONE ENCOUNTER
Message sent to  to add the patient to Dr. Africa Ocampo schedule per his request. Physical therapy order is pended. Dr. Zahira Leos, please clarify what [facility] location order should be sent to.

## 2020-10-22 NOTE — TELEPHONE ENCOUNTER
Please fax an order to [facility] for physical therapy and electrical nerve stimulation for left Bell palsy    Also, please put him on my schedule for this coming Monday 10/25 at 8:00

## 2020-10-23 NOTE — TELEPHONE ENCOUNTER
Elan, location is in [facility], phone [phone],  Fax [phone], [email or web address]

## 2020-10-27 ENCOUNTER — TELEPHONE (OUTPATIENT)
Dept: INTERNAL MEDICINE CLINIC | Facility: CLINIC | Age: 35
End: 2020-10-27

## 2020-10-27 RX ORDER — DOXYCYCLINE 100 MG/1
100 TABLET ORAL 2 TIMES DAILY
Qty: 20 TABLET | Refills: 0 | Status: SHIPPED | OUTPATIENT
Start: 2020-10-27 | End: 2020-11-06

## 2020-10-27 NOTE — TELEPHONE ENCOUNTER
I spoke to Rudy Prince on the phone. About 2 weeks prior to the onset of's palsy he was out in the [facility] with his son camping.   Because of the possibility of Lyme disease I am giving doxycycline 100 mg twice daily for 10 days and will check a titer when he co

## 2020-10-28 ENCOUNTER — TELEPHONE (OUTPATIENT)
Dept: INTERNAL MEDICINE CLINIC | Facility: CLINIC | Age: 35
End: 2020-10-28

## 2020-10-28 ENCOUNTER — OFFICE VISIT (OUTPATIENT)
Dept: INTERNAL MEDICINE CLINIC | Facility: CLINIC | Age: 35
End: 2020-10-28
Payer: COMMERCIAL

## 2020-10-28 ENCOUNTER — LAB ENCOUNTER (OUTPATIENT)
Dept: LAB | Age: 35
End: 2020-10-28
Attending: INTERNAL MEDICINE
Payer: COMMERCIAL

## 2020-10-28 VITALS
SYSTOLIC BLOOD PRESSURE: 128 MMHG | BODY MASS INDEX: 31 KG/M2 | TEMPERATURE: 98 F | OXYGEN SATURATION: 99 % | DIASTOLIC BLOOD PRESSURE: 80 MMHG | HEART RATE: 69 BPM | WEIGHT: 216 LBS

## 2020-10-28 DIAGNOSIS — G51.0 BELL PALSY: Primary | ICD-10-CM

## 2020-10-28 DIAGNOSIS — G51.0 BELL PALSY: ICD-10-CM

## 2020-10-28 DIAGNOSIS — E10.65 TYPE 1 DIABETES MELLITUS WITH HYPERGLYCEMIA (HCC): ICD-10-CM

## 2020-10-28 DIAGNOSIS — E13.9 DIABETES 1.5, MANAGED AS TYPE 1 (HCC): ICD-10-CM

## 2020-10-28 DIAGNOSIS — E78.00 HYPERCHOLESTEREMIA: ICD-10-CM

## 2020-10-28 PROCEDURE — 80053 COMPREHEN METABOLIC PANEL: CPT

## 2020-10-28 PROCEDURE — 86618 LYME DISEASE ANTIBODY: CPT

## 2020-10-28 PROCEDURE — 81001 URINALYSIS AUTO W/SCOPE: CPT

## 2020-10-28 PROCEDURE — 3074F SYST BP LT 130 MM HG: CPT | Performed by: INTERNAL MEDICINE

## 2020-10-28 PROCEDURE — 3079F DIAST BP 80-89 MM HG: CPT | Performed by: INTERNAL MEDICINE

## 2020-10-28 PROCEDURE — 85025 COMPLETE CBC W/AUTO DIFF WBC: CPT

## 2020-10-28 PROCEDURE — 84443 ASSAY THYROID STIM HORMONE: CPT

## 2020-10-28 PROCEDURE — 80061 LIPID PANEL: CPT

## 2020-10-28 PROCEDURE — 81015 MICROSCOPIC EXAM OF URINE: CPT

## 2020-10-28 PROCEDURE — 99214 OFFICE O/P EST MOD 30 MIN: CPT | Performed by: INTERNAL MEDICINE

## 2020-10-28 PROCEDURE — 82043 UR ALBUMIN QUANTITATIVE: CPT

## 2020-10-28 PROCEDURE — 82570 ASSAY OF URINE CREATININE: CPT

## 2020-10-28 PROCEDURE — 36415 COLL VENOUS BLD VENIPUNCTURE: CPT

## 2020-10-28 RX ORDER — METHYLPREDNISOLONE 4 MG/1
TABLET ORAL
COMMUNITY
Start: 2020-10-22 | End: 2020-11-23 | Stop reason: ALTCHOICE

## 2020-10-28 NOTE — PROGRESS NOTES
Lauren Obregon is a 38year old male. HPI:   The day after I saw him about 1 week ago he awakened and noted a left facial droop and paresthesias over the left cheek.   I advised him to proceed to the emergency room where he was diagnosed with a left-side monitoring - change every 10 days. Dexcom G6 Sensor 3-Pack 1 each 11   • Continuous Blood Gluc Transmit (DEXCOM G6 TRANSMITTER) Does not apply Misc Use as directed for continuous glucose monitoring - change every 3 months.  1 each 3   • ALPRAZolam 0.25 MG O Social History:  Social History    Tobacco Use      Smoking status: Former Smoker        Years: 2.00        Types: Cigarettes      Smokeless tobacco: Never Used    Alcohol use:  Yes      Alcohol/week: 2.0 standard drinks      Types: 2 Standard drinks or e

## 2020-11-01 ENCOUNTER — TELEPHONE (OUTPATIENT)
Dept: INTERNAL MEDICINE CLINIC | Facility: CLINIC | Age: 35
End: 2020-11-01

## 2020-11-01 DIAGNOSIS — G51.0 FACIAL PALSY: Primary | ICD-10-CM

## 2020-11-02 ENCOUNTER — MED REC SCAN ONLY (OUTPATIENT)
Dept: INTERNAL MEDICINE CLINIC | Facility: CLINIC | Age: 35
End: 2020-11-02

## 2020-11-02 RX ORDER — INSULIN ASPART 100 [IU]/ML
INJECTION, SOLUTION INTRAVENOUS; SUBCUTANEOUS
Qty: 30 ML | Refills: 3 | Status: SHIPPED | OUTPATIENT
Start: 2020-11-02 | End: 2021-09-16

## 2020-11-23 ENCOUNTER — OFFICE VISIT (OUTPATIENT)
Dept: INTERNAL MEDICINE CLINIC | Facility: CLINIC | Age: 35
End: 2020-11-23
Payer: COMMERCIAL

## 2020-11-23 VITALS
SYSTOLIC BLOOD PRESSURE: 138 MMHG | OXYGEN SATURATION: 99 % | HEIGHT: 70 IN | HEART RATE: 71 BPM | TEMPERATURE: 98 F | BODY MASS INDEX: 31.64 KG/M2 | DIASTOLIC BLOOD PRESSURE: 76 MMHG | WEIGHT: 221 LBS

## 2020-11-23 DIAGNOSIS — G51.0 BELL PALSY: Primary | ICD-10-CM

## 2020-11-23 DIAGNOSIS — E10.65 TYPE 1 DIABETES MELLITUS WITH HYPERGLYCEMIA (HCC): ICD-10-CM

## 2020-11-23 PROCEDURE — 3008F BODY MASS INDEX DOCD: CPT | Performed by: INTERNAL MEDICINE

## 2020-11-23 PROCEDURE — 99213 OFFICE O/P EST LOW 20 MIN: CPT | Performed by: INTERNAL MEDICINE

## 2020-11-23 PROCEDURE — 90686 IIV4 VACC NO PRSV 0.5 ML IM: CPT | Performed by: INTERNAL MEDICINE

## 2020-11-23 PROCEDURE — 90471 IMMUNIZATION ADMIN: CPT | Performed by: INTERNAL MEDICINE

## 2020-11-23 PROCEDURE — 99072 ADDL SUPL MATRL&STAF TM PHE: CPT | Performed by: INTERNAL MEDICINE

## 2020-11-23 PROCEDURE — 3078F DIAST BP <80 MM HG: CPT | Performed by: INTERNAL MEDICINE

## 2020-11-23 PROCEDURE — 3075F SYST BP GE 130 - 139MM HG: CPT | Performed by: INTERNAL MEDICINE

## 2020-11-23 NOTE — PROGRESS NOTES
Erica Pickering is a 38year old male. HPI:   He is very slowly improving - now can close his eye easily, cannot wrinkle forehead and cannot smile normally. DM doing well     SR: no chest pain or sob, no gu or gi sx.     BP Readings from Last 6 Encoun Diagnosis Date   • Excessive blinking 2014    Per NextGen:  \"Excessive blinking, OU. \"   • Lipid screening 02-    Per NextGen   • Meibomian gland dysfunction 2014    Per NextGen:  \"Meibomian gland dysfunction, OU. \"   • Myopia OU 2014    Per [facility] at [facility] for his opinion     2. Type 1 diabetes mellitus with hyperglycemia (HCC)  Doing well. The patient indicates understanding of these issues and agrees to the plan. The patient is asked to return in 3 months. Tye Qureshi

## 2021-01-14 ENCOUNTER — TELEPHONE (OUTPATIENT)
Dept: INTERNAL MEDICINE CLINIC | Facility: CLINIC | Age: 36
End: 2021-01-14

## 2021-01-14 NOTE — TELEPHONE ENCOUNTER
Patient is calling to discuss with Dr Guicho Arias his upcoming surgery on 1/21  He wants to make sure Dr Guicho Arias is on board    Please call [phone]    He is aware Dr Guicho Arias is out of the office, okay to wait until he returns on Monday

## 2021-02-12 ENCOUNTER — TELEPHONE (OUTPATIENT)
Dept: ENDOCRINOLOGY CLINIC | Facility: CLINIC | Age: 36
End: 2021-02-12

## 2021-02-12 NOTE — TELEPHONE ENCOUNTER
Pt booked f/u apt May 7, 2021 w/ [facility].   LOV 1/31/20. Pt asking for sooner apt - Virtual requested for sooner apt. Should I book with APN? RN faxed LMN forms to [employer] w/ 2 LOV notes. Pt last seen 1/31/2020.  Pt states he has not booked due to COVID

## 2021-03-25 ENCOUNTER — OFFICE VISIT (OUTPATIENT)
Dept: ENDOCRINOLOGY CLINIC | Facility: CLINIC | Age: 36
End: 2021-03-25
Payer: COMMERCIAL

## 2021-03-25 VITALS
BODY MASS INDEX: 33 KG/M2 | SYSTOLIC BLOOD PRESSURE: 124 MMHG | DIASTOLIC BLOOD PRESSURE: 80 MMHG | WEIGHT: 227 LBS | HEART RATE: 75 BPM

## 2021-03-25 DIAGNOSIS — E10.9 CONTROLLED DIABETES MELLITUS TYPE 1 WITHOUT COMPLICATIONS (HCC): Primary | ICD-10-CM

## 2021-03-25 LAB
CARTRIDGE LOT#: ABNORMAL NUMERIC
GLUCOSE BLOOD: 157
HEMOGLOBIN A1C: 5.9 % (ref 4.3–5.6)
TEST STRIP LOT #: NORMAL NUMERIC

## 2021-03-25 PROCEDURE — 83036 HEMOGLOBIN GLYCOSYLATED A1C: CPT | Performed by: NURSE PRACTITIONER

## 2021-03-25 PROCEDURE — 99214 OFFICE O/P EST MOD 30 MIN: CPT | Performed by: NURSE PRACTITIONER

## 2021-03-25 PROCEDURE — 3074F SYST BP LT 130 MM HG: CPT | Performed by: NURSE PRACTITIONER

## 2021-03-25 PROCEDURE — 3079F DIAST BP 80-89 MM HG: CPT | Performed by: NURSE PRACTITIONER

## 2021-03-25 PROCEDURE — 95251 CONT GLUC MNTR ANALYSIS I&R: CPT | Performed by: NURSE PRACTITIONER

## 2021-03-25 PROCEDURE — 36416 COLLJ CAPILLARY BLOOD SPEC: CPT | Performed by: NURSE PRACTITIONER

## 2021-03-25 NOTE — PATIENT INSTRUCTIONS
A1C: 5.9% today -->improved from 7.0% on 1/31/2020  Blood glucose: 157 in clinic today       Medications:     Basal:  12A 1.10  5:30A 1.40  12P 1.40  4P 1.40  6P 1.60  9P 1.45    I:CR   12A 7.0  12P 5.8  4P 6.5     Active Insulin Time 3 hrs    Sensitivity

## 2021-06-08 ENCOUNTER — TELEPHONE (OUTPATIENT)
Dept: ENDOCRINOLOGY CLINIC | Facility: CLINIC | Age: 36
End: 2021-06-08

## 2021-06-08 NOTE — TELEPHONE ENCOUNTER
[facility] 1) [employer] requesting most recent progress notes from the last 12 months for insurance purposes for CGM. Faxed to number [phone].     2) Second fax from Juan Ramon is a DWO for CGM.  Filled out and left on p

## 2021-07-26 ENCOUNTER — TELEPHONE (OUTPATIENT)
Dept: INTERNAL MEDICINE CLINIC | Facility: CLINIC | Age: 36
End: 2021-07-26

## 2021-07-26 RX ORDER — ALPRAZOLAM 0.25 MG/1
TABLET ORAL
Qty: 30 TABLET | Refills: 0 | Status: SHIPPED | OUTPATIENT
Start: 2021-07-26

## 2021-07-26 NOTE — TELEPHONE ENCOUNTER
Carol 104, [address] [facility] requesting NEW RX for:  Lorazepam  Tasked to [employer]

## 2021-07-26 NOTE — TELEPHONE ENCOUNTER
I spoke with patient. He asks for alprazolam to be refilled for him for flying. He asks for the medication he was given about 2 years ago. Pended to Dr. Cat Esposito for review.

## 2021-09-15 NOTE — TELEPHONE ENCOUNTER
•  insulin aspart 100 UNIT/ML Subcutaneous Solution, INJECT UP TO MAXIMUM 100 UNITS VIA INSULIN PUMP EVERY DAY, Disp: 30 mL, Rfl: 3

## 2021-09-16 RX ORDER — INSULIN ASPART 100 [IU]/ML
INJECTION, SOLUTION INTRAVENOUS; SUBCUTANEOUS
Qty: 30 ML | Refills: 3 | Status: SHIPPED | OUTPATIENT
Start: 2021-09-16 | End: 2022-01-21

## 2021-10-14 ENCOUNTER — APPOINTMENT (OUTPATIENT)
Dept: GENERAL RADIOLOGY | Age: 36
End: 2021-10-14
Attending: EMERGENCY MEDICINE

## 2021-10-14 ENCOUNTER — HOSPITAL ENCOUNTER (EMERGENCY)
Age: 36
Discharge: HOME | End: 2021-10-15
Attending: EMERGENCY MEDICINE

## 2021-10-14 ENCOUNTER — APPOINTMENT (OUTPATIENT)
Dept: VASCULAR LAB | Age: 36
End: 2021-10-14
Attending: EMERGENCY MEDICINE

## 2021-10-14 ENCOUNTER — APPOINTMENT (OUTPATIENT)
Dept: CT IMAGING | Age: 36
End: 2021-10-14
Attending: EMERGENCY MEDICINE

## 2021-10-14 VITALS
DIASTOLIC BLOOD PRESSURE: 89 MMHG | SYSTOLIC BLOOD PRESSURE: 137 MMHG | WEIGHT: 215 LBS | OXYGEN SATURATION: 98 % | RESPIRATION RATE: 16 BRPM | HEART RATE: 70 BPM

## 2021-10-14 DIAGNOSIS — R07.9 CHEST PAIN, UNSPECIFIED TYPE: Primary | ICD-10-CM

## 2021-10-14 LAB
ALBUMIN SERPL-MCNC: 4.2 G/DL (ref 3.6–5.1)
ALBUMIN/GLOB SERPL: 1.2 {RATIO} (ref 1–2.4)
ALP SERPL-CCNC: 121 UNITS/L (ref 45–117)
ALT SERPL-CCNC: 29 UNITS/L
ANION GAP SERPL CALC-SCNC: 4 MMOL/L (ref 10–20)
AST SERPL-CCNC: 28 UNITS/L
BASOPHILS # BLD: 0 K/MCL (ref 0–0.3)
BASOPHILS NFR BLD: 1 %
BILIRUB SERPL-MCNC: 1.1 MG/DL (ref 0.2–1)
BUN SERPL-MCNC: 17 MG/DL (ref 6–20)
BUN/CREAT SERPL: 20 (ref 7–25)
CALCIUM SERPL-MCNC: 9.2 MG/DL (ref 8.4–10.2)
CHLORIDE SERPL-SCNC: 105 MMOL/L (ref 98–107)
CO2 SERPL-SCNC: 33 MMOL/L (ref 21–32)
CREAT SERPL-MCNC: 0.84 MG/DL (ref 0.67–1.17)
D DIMER PPP FEU-MCNC: <0.19 MG/L (FEU)
DEPRECATED RDW RBC: 42.5 FL (ref 39–50)
EOSINOPHIL # BLD: 0.1 K/MCL (ref 0–0.5)
EOSINOPHIL NFR BLD: 1 %
ERYTHROCYTE [DISTWIDTH] IN BLOOD: 12.3 % (ref 11–15)
FASTING DURATION TIME PATIENT: ABNORMAL H
GFR SERPLBLD BASED ON 1.73 SQ M-ARVRAT: >90 ML/MIN
GLOBULIN SER-MCNC: 3.6 G/DL (ref 2–4)
GLUCOSE SERPL-MCNC: 188 MG/DL (ref 70–99)
HCT VFR BLD CALC: 45.2 % (ref 39–51)
HGB BLD-MCNC: 15.3 G/DL (ref 13–17)
IMM GRANULOCYTES # BLD AUTO: 0 K/MCL (ref 0–0.2)
IMM GRANULOCYTES # BLD: 0 %
LYMPHOCYTES # BLD: 2 K/MCL (ref 1–4.8)
LYMPHOCYTES NFR BLD: 33 %
MCH RBC QN AUTO: 31.8 PG (ref 26–34)
MCHC RBC AUTO-ENTMCNC: 33.8 G/DL (ref 32–36.5)
MCV RBC AUTO: 94 FL (ref 78–100)
MONOCYTES # BLD: 0.5 K/MCL (ref 0.3–0.9)
MONOCYTES NFR BLD: 8 %
NEUTROPHILS # BLD: 3.6 K/MCL (ref 1.8–7.7)
NEUTROPHILS NFR BLD: 57 %
NRBC BLD MANUAL-RTO: 0 /100 WBC
PLATELET # BLD AUTO: 204 K/MCL (ref 140–450)
POTASSIUM SERPL-SCNC: 4.8 MMOL/L (ref 3.4–5.1)
PROT SERPL-MCNC: 7.8 G/DL (ref 6.4–8.2)
RBC # BLD: 4.81 MIL/MCL (ref 4.5–5.9)
SODIUM SERPL-SCNC: 137 MMOL/L (ref 135–145)
TROPONIN I SERPL HS-MCNC: <0.02 NG/ML
TROPONIN I SERPL HS-MCNC: <0.02 NG/ML
WBC # BLD: 6.2 K/MCL (ref 4.2–11)

## 2021-10-14 PROCEDURE — 71045 X-RAY EXAM CHEST 1 VIEW: CPT

## 2021-10-14 PROCEDURE — 84484 ASSAY OF TROPONIN QUANT: CPT | Performed by: EMERGENCY MEDICINE

## 2021-10-14 PROCEDURE — 71275 CT ANGIOGRAPHY CHEST: CPT

## 2021-10-14 PROCEDURE — 99285 EMERGENCY DEPT VISIT HI MDM: CPT

## 2021-10-14 PROCEDURE — 93005 ELECTROCARDIOGRAM TRACING: CPT | Performed by: EMERGENCY MEDICINE

## 2021-10-14 PROCEDURE — 80053 COMPREHEN METABOLIC PANEL: CPT | Performed by: EMERGENCY MEDICINE

## 2021-10-14 PROCEDURE — 93971 EXTREMITY STUDY: CPT

## 2021-10-14 PROCEDURE — 36415 COLL VENOUS BLD VENIPUNCTURE: CPT

## 2021-10-14 PROCEDURE — 99284 EMERGENCY DEPT VISIT MOD MDM: CPT | Performed by: EMERGENCY MEDICINE

## 2021-10-14 PROCEDURE — 85379 FIBRIN DEGRADATION QUANT: CPT | Performed by: EMERGENCY MEDICINE

## 2021-10-14 PROCEDURE — 85025 COMPLETE CBC W/AUTO DIFF WBC: CPT | Performed by: EMERGENCY MEDICINE

## 2021-10-14 PROCEDURE — 93010 ELECTROCARDIOGRAM REPORT: CPT | Performed by: INTERNAL MEDICINE

## 2021-10-15 LAB
ATRIAL RATE (BPM): 67
ATRIAL RATE (BPM): 91
P AXIS (DEGREES): 50
P AXIS (DEGREES): 66
PR-INTERVAL (MSEC): 142
PR-INTERVAL (MSEC): 149
QRS-INTERVAL (MSEC): 85
QRS-INTERVAL (MSEC): 86
QT-INTERVAL (MSEC): 344
QT-INTERVAL (MSEC): 385
QTC: 407
QTC: 426
R AXIS (DEGREES): 25
R AXIS (DEGREES): 44
RAINBOW EXTRA TUBES HOLD SPECIMEN: NORMAL
RAINBOW EXTRA TUBES HOLD SPECIMEN: NORMAL
REPORT TEXT: NORMAL
REPORT TEXT: NORMAL
T AXIS (DEGREES): 16
T AXIS (DEGREES): 19
VENTRICULAR RATE EKG/MIN (BPM): 67
VENTRICULAR RATE EKG/MIN (BPM): 92

## 2021-10-15 PROCEDURE — 10002805 HB CONTRAST AGENT: Performed by: EMERGENCY MEDICINE

## 2021-10-15 RX ADMIN — IOHEXOL 100 ML: 350 INJECTION, SOLUTION INTRAVENOUS at 00:21

## 2021-10-15 RX ADMIN — IOHEXOL 100 ML: 350 INJECTION, SOLUTION INTRAVENOUS at 00:30

## 2021-10-15 ASSESSMENT — HEART SCORE
HEART SCORE: 0
AGE: LESS THAN OR EQUAL TO 45
EKG: NORMAL
HISTORY: SLIGHTLY SUSPICIOUS
RISK FACTORS: NO RISK FACTORS KNOWN
TROPONIN: EQUAL OR LESS THAN NORMAL LIMIT

## 2022-01-21 ENCOUNTER — TELEMEDICINE (OUTPATIENT)
Dept: ENDOCRINOLOGY CLINIC | Facility: CLINIC | Age: 37
End: 2022-01-21

## 2022-01-21 DIAGNOSIS — E10.9 CONTROLLED DIABETES MELLITUS TYPE 1 WITHOUT COMPLICATIONS (HCC): Primary | ICD-10-CM

## 2022-01-21 PROCEDURE — 99214 OFFICE O/P EST MOD 30 MIN: CPT | Performed by: INTERNAL MEDICINE

## 2022-01-21 RX ORDER — INSULIN ASPART 100 [IU]/ML
INJECTION, SOLUTION INTRAVENOUS; SUBCUTANEOUS
Qty: 30 ML | Refills: 3 | Status: SHIPPED | OUTPATIENT
Start: 2022-01-21

## 2022-01-21 NOTE — PROGRESS NOTES
Please note that the following visit was completed using two-way, real-time interactive audio and video communication.   This has been done in good diane to provide continuity of care in the best interest of the provider-patient relationship, due to the christiano TO MAXIMUM 100 UNITS VIA INSULIN PUMP EVERY DAY, Disp: 30 mL, Rfl: 3  •  ALPRAZolam 0.25 MG Oral Tab, Take 1 tablet by mouth 5 hours before flight and then take 1 tablet prior to flight as directed, Disp: 30 tablet, Rfl: 0  •  Continuous Blood Gluc Receive daily      Medical History:   Past Medical History:   Diagnosis Date   • Excessive blinking 2014    Per NextGen:  \"Excessive blinking, OU. \"   • Lipid screening 02-    Per NextGen   • Meibomian gland dysfunction 2014    Per NextGen:  \"Meibomian gl of diabetes include retinopathy, neuropathy, nephropathy and cardiovascular disease  -Discussed importance of SBGM  -Discussed importance of CHO counting  -Continue current pump settings  -Continue [employer] pump with Control IQ   -Labs are stable   -Foot exa

## 2022-03-14 RX ORDER — INSULIN ASPART 100 [IU]/ML
INJECTION, SOLUTION INTRAVENOUS; SUBCUTANEOUS
Qty: 30 ML | Refills: 2 | Status: SHIPPED | OUTPATIENT
Start: 2022-03-14

## 2022-05-14 RX ORDER — ALPRAZOLAM 0.25 MG/1
TABLET ORAL
Qty: 30 TABLET | Refills: 0 | OUTPATIENT
Start: 2022-05-14

## 2022-05-17 ENCOUNTER — TELEPHONE (OUTPATIENT)
Dept: INTERNAL MEDICINE CLINIC | Facility: CLINIC | Age: 37
End: 2022-05-17

## 2022-05-17 RX ORDER — ALPRAZOLAM 0.25 MG/1
TABLET ORAL
Qty: 30 TABLET | Refills: 3 | Status: SHIPPED | OUTPATIENT
Start: 2022-05-17

## 2022-05-17 NOTE — TELEPHONE ENCOUNTER
Patient last saw Elvia Maradiaga 11/23/2020.  Needs appointment for annual physical. To EMA  to please call patient and assist with scheduling then back to Rx group for refill

## 2022-05-17 NOTE — TELEPHONE ENCOUNTER
Please review Dr. Citlali Ly pt has scheduled his appointment for 5/25/2022 as it has been two years since he was last seen  Checked I  Alprazolam not reflected  Last written 7/26/2021 #30

## 2022-05-25 ENCOUNTER — OFFICE VISIT (OUTPATIENT)
Dept: INTERNAL MEDICINE CLINIC | Facility: CLINIC | Age: 37
End: 2022-05-25
Payer: COMMERCIAL

## 2022-05-25 VITALS
OXYGEN SATURATION: 99 % | HEIGHT: 70 IN | HEART RATE: 83 BPM | DIASTOLIC BLOOD PRESSURE: 80 MMHG | BODY MASS INDEX: 32.78 KG/M2 | SYSTOLIC BLOOD PRESSURE: 130 MMHG | TEMPERATURE: 98 F | WEIGHT: 229 LBS

## 2022-05-25 DIAGNOSIS — E10.65 TYPE 1 DIABETES MELLITUS WITH HYPERGLYCEMIA (HCC): ICD-10-CM

## 2022-05-25 DIAGNOSIS — Z00.00 ANNUAL PHYSICAL EXAM: Primary | ICD-10-CM

## 2022-05-25 DIAGNOSIS — Z80.0 FAMILY HISTORY OF COLON CANCER: ICD-10-CM

## 2022-05-25 PROCEDURE — 99395 PREV VISIT EST AGE 18-39: CPT | Performed by: INTERNAL MEDICINE

## 2022-05-25 PROCEDURE — 3079F DIAST BP 80-89 MM HG: CPT | Performed by: INTERNAL MEDICINE

## 2022-05-25 PROCEDURE — 3008F BODY MASS INDEX DOCD: CPT | Performed by: INTERNAL MEDICINE

## 2022-05-25 PROCEDURE — 3075F SYST BP GE 130 - 139MM HG: CPT | Performed by: INTERNAL MEDICINE

## 2022-05-25 RX ORDER — BENZONATATE 100 MG/1
100 CAPSULE ORAL 3 TIMES DAILY PRN
Qty: 30 CAPSULE | Refills: 1 | Status: SHIPPED | OUTPATIENT
Start: 2022-05-25

## 2023-01-06 RX ORDER — INSULIN ASPART 100 [IU]/ML
INJECTION, SOLUTION INTRAVENOUS; SUBCUTANEOUS
Qty: 30 ML | Refills: 2 | Status: CANCELLED | OUTPATIENT
Start: 2023-01-06

## 2023-01-10 RX ORDER — INSULIN ASPART 100 [IU]/ML
INJECTION, SOLUTION INTRAVENOUS; SUBCUTANEOUS
Qty: 30 ML | Refills: 2 | Status: SHIPPED | OUTPATIENT
Start: 2023-01-10

## 2023-04-14 RX ORDER — INSULIN ASPART 100 [IU]/ML
INJECTION, SOLUTION INTRAVENOUS; SUBCUTANEOUS
Qty: 30 ML | Refills: 2 | Status: SHIPPED | OUTPATIENT
Start: 2023-04-14

## 2023-06-30 ENCOUNTER — APPOINTMENT (OUTPATIENT)
Dept: URGENT CARE | Age: 38
End: 2023-06-30

## 2023-06-30 ENCOUNTER — TELEPHONE (OUTPATIENT)
Dept: INTERNAL MEDICINE CLINIC | Facility: CLINIC | Age: 38
End: 2023-06-30

## 2023-07-21 RX ORDER — INSULIN ASPART 100 [IU]/ML
INJECTION, SOLUTION INTRAVENOUS; SUBCUTANEOUS
Qty: 30 ML | Refills: 0 | Status: SHIPPED | OUTPATIENT
Start: 2023-07-21

## 2023-07-21 NOTE — TELEPHONE ENCOUNTER
Pharmacy requesting a refill      NOVOLOG 100 UNIT/ML Injection Solution, INJECT UP TO MAXIMUM  UNITS UNDER THE SKIN VIA INSULIN PUMP EVERY DAY, Disp: 30 mL, Rfl: 2

## 2023-07-24 ENCOUNTER — TELEPHONE (OUTPATIENT)
Dept: ENDOCRINOLOGY CLINIC | Facility: CLINIC | Age: 38
End: 2023-07-24

## 2023-07-24 NOTE — TELEPHONE ENCOUNTER
Current Outpatient Medications   Medication Sig Dispense Refill    insulin aspart (NOVOLOG) 100 Units/mL Injection Solution INJECT UP TO MAXIMUM  UNITS UNDER THE SKIN VIA INSULIN PUMP EVERY DAY 30 mL 0    Prior Authorization needed for [place]

## 2023-07-27 RX ORDER — INSULIN ASPART 100 [IU]/ML
INJECTION, SOLUTION INTRAVENOUS; SUBCUTANEOUS
Qty: 30 ML | Refills: 0 | Status: SHIPPED | OUTPATIENT
Start: 2023-07-27

## 2023-07-27 NOTE — TELEPHONE ENCOUNTER
[facility] states pt is out of the insulin please follow up

## 2023-07-28 NOTE — TELEPHONE ENCOUNTER
PA submitted for Novolog via epic - called pharmacist who ran scripts and stated that Humalog brand is preferred by insurance. RN changed prescription verbally to Humalog since patient is on an insulin pump and does not have insulin. Called patient who stated he is ok with switching to Humalog vials. Notified patient that he should be able to pick this up shortly. I also did tell patient to schedule an appointment - [address] was 1/21/22. Patient stated he will book appointment with Dr. Awilda Christie.

## 2023-09-06 RX ORDER — PROCHLORPERAZINE 25 MG/1
SUPPOSITORY RECTAL
Qty: 9 EACH | Refills: 0 | Status: SHIPPED | OUTPATIENT
Start: 2023-09-06

## 2023-09-06 NOTE — TELEPHONE ENCOUNTER
Patient is calling to get a refill on the following. Patient states that he is on his last sensor         Continuous Blood Gluc Sensor (DEXCOM G6 SENSOR) Does not apply Misc 1 each 11 4/29/2020    Sig:   Use as directed for continuous glucose monitoring - change every 10 days. Dexcom G6 Sensor 3-Pack     Route:   (none)     Note to Pharmacy:   ND: [id number]     Class:   Print Script     Order #:   [id number]       Continuous Blood Gluc Transmit (DEXCOM G6 TRANSMITTER) Does not apply Misc 1 each 3 4/29/2020    Sig:   Use as directed for continuous glucose monitoring - change every 3 months.      Route:   (none)     Note to Pharmacy:   Trever Crews 45: [id number]     Class:   Print Script     Order #:   [id number]

## 2023-09-07 ENCOUNTER — PATIENT MESSAGE (OUTPATIENT)
Dept: ENDOCRINOLOGY CLINIC | Facility: CLINIC | Age: 38
End: 2023-09-07

## 2023-09-07 ENCOUNTER — TELEPHONE (OUTPATIENT)
Dept: ENDOCRINOLOGY CLINIC | Facility: CLINIC | Age: 38
End: 2023-09-07

## 2023-09-07 RX ORDER — INSULIN LISPRO 100 [IU]/ML
INJECTION, SOLUTION INTRAVENOUS; SUBCUTANEOUS
Qty: 30 ML | Refills: 2 | Status: SHIPPED | OUTPATIENT
Start: 2023-09-07

## 2023-09-07 RX ORDER — INSULIN LISPRO 100 [IU]/ML
INJECTION, SOLUTION INTRAVENOUS; SUBCUTANEOUS
Qty: 90 ML | Refills: 0 | Status: SHIPPED | OUTPATIENT
Start: 2023-09-07

## 2023-09-07 NOTE — TELEPHONE ENCOUNTER
Pharmacy calling to follow up on a PA for Dexcom G6, for sensor ([id number]) and transmitter ([id number]). States pt is completely out. Please all and advise.

## 2023-09-07 NOTE — TELEPHONE ENCOUNTER
Lyn 57  States end of August patient's medical insurance no longer covering Dexcom G6 sensors and transmitter, so patient's CGM got transferred to their pharmacy department. RN completed PA via covermymeds using key codes below. Gayatri   Notified about approval. Ran it and went through (for sensor and transmitter). States she will contact the patient to schedule delivery.

## 2023-12-19 ENCOUNTER — OFFICE VISIT (OUTPATIENT)
Dept: INTERNAL MEDICINE CLINIC | Facility: CLINIC | Age: 38
End: 2023-12-19

## 2023-12-19 VITALS
HEART RATE: 88 BPM | DIASTOLIC BLOOD PRESSURE: 76 MMHG | SYSTOLIC BLOOD PRESSURE: 120 MMHG | TEMPERATURE: 98 F | BODY MASS INDEX: 33.53 KG/M2 | WEIGHT: 234.19 LBS | HEIGHT: 70 IN | OXYGEN SATURATION: 97 %

## 2023-12-19 DIAGNOSIS — Z01.818 PRE-OP EVALUATION: ICD-10-CM

## 2023-12-19 DIAGNOSIS — Z80.0 FAMILY HISTORY OF COLON CANCER: ICD-10-CM

## 2023-12-19 DIAGNOSIS — E10.65 TYPE 1 DIABETES MELLITUS WITH HYPERGLYCEMIA (HCC): ICD-10-CM

## 2023-12-19 DIAGNOSIS — Z00.00 ANNUAL PHYSICAL EXAM: Primary | ICD-10-CM

## 2023-12-19 PROBLEM — S69.92XA INJURY OF LEFT WRIST: Status: ACTIVE | Noted: 2023-12-19

## 2023-12-19 PROCEDURE — 3078F DIAST BP <80 MM HG: CPT | Performed by: INTERNAL MEDICINE

## 2023-12-19 PROCEDURE — 99396 PREV VISIT EST AGE 40-64: CPT | Performed by: INTERNAL MEDICINE

## 2023-12-19 PROCEDURE — 3074F SYST BP LT 130 MM HG: CPT | Performed by: INTERNAL MEDICINE

## 2023-12-19 PROCEDURE — 3008F BODY MASS INDEX DOCD: CPT | Performed by: INTERNAL MEDICINE

## 2023-12-19 NOTE — PROGRESS NOTES
Moy Fraire is a 38 year old male.  HPI:   He is here for annual wellness exam and preoperative clearance for an injury of the left wrist that occurred in October 2022.  He was lifting and holding a heavy piece of pork on a hyperextended left wrist as a  would hold a tray and he had sudden and severe pain at that time.  It has become progressively worse and he now feels the wrist is unstable and he has great difficulty even opening a jar with the left hand.  He describes what sounds like a rupture of the ulnar collateral ligament,  However, is unconfirmed at this time but suffice it to say that some type of repair of an injury to the left lateral wrist will take place by Dr. Moreau at an outpatient [facility] 1/16/2023.    He has otherwise been feeling well although his weight is up 20 pounds. He has no alarm sx.     His diabetes has been stable and well-managed.      SR: no chest pain or sob, no gu or gi sx.    BP Readings from Last 6 Encounters:   12/19/23 120/76   05/25/22 130/80   03/25/21 124/80   11/23/20 138/76   10/28/20 128/80   10/21/20 116/72     Wt Readings from Last 6 Encounters:   12/19/23 234 lb 3.2 oz (106.2 kg)   05/25/22 229 lb (103.9 kg)   03/18/22 210 lb (95.3 kg)   03/25/21 227 lb (103 kg)   11/23/20 221 lb (100.2 kg)   10/28/20 216 lb (98 kg)     Current Outpatient Medications   Medication Sig Dispense Refill    insulin lispro (HUMALOG) 100 UNIT/ML Injection Solution Inject 100 units subcutaneous daily via insulin pump 30 mL 2    insulin lispro (HUMALOG) 100 UNIT/ML Injection Solution Inject 100 units via insulin pump daily 90 mL 0    Continuous Blood Gluc Sensor (DEXCOM G6 SENSOR) Does not apply Misc Use as directed for continuous glucose monitoring - change every 10 days. Dexcom G6 Sensor 3-Pack 9 each 0    Continuous Blood Gluc Transmit (DEXCOM G6 TRANSMITTER) Does not apply Misc Use as directed for continuous glucose monitoring - change every 3 months. 9  each 0    ALPRAZolam 0.25 MG Oral Tab TAKE 1 TABLET BY MOUTH 5 HOURS BEFORE FLIGHT AND THEN 1 TABLET PRIOR TO FLIGHT AS DIRECTED; TAKE 1 TABLET EVERY DAY AS NEEDED FOR ANXIETY 30 tablet 3    Continuous Blood Gluc  (DEXCOM G6 ) Does not apply Device Use as directed for continuous glucose monitoring. 1 Device 0    ACCU-CHEK FASTCLIX LANCETS Does not apply Misc CHECK SUGAR FOUR TIMES DAILY AS DIRECTED 200 each 2    ONETOUCH ULTRA BLUE In Vitro Strip CHECK BLOOD GLUCOSE 3 TIMES DAILY AS DIRECTED 200 strip 2    Glucose Blood (DOC CONTOUR NEXT TEST) In Vitro Strip Use to check blood sugar 4 times daily as directed 150 each 5    Blood Glucose Monitoring Suppl (ACCU-CHEK WALTER PLUS) W/DEVICE Does not apply Kit Use to check blood sugar as directed. 1 kit 0      Past Medical History:   Diagnosis Date    Excessive blinking 2014    Per NextGen:  \"Excessive blinking, OU.\"    Lipid screening 02-    Per NextGen    Meibomian gland dysfunction 2014    Per NextGen:  \"Meibomian gland dysfunction, OU.\"    Myopia OU 2014    Per NextGen:  \"Mild Myopia, OU.\"    Other ill-defined conditions(799.89) 04/2008    Per NextGen:  Heparin-Induced Platelet Antibody - positive    Other ill-defined conditions(799.89)     Per NextGen:  JULIET positive    Other specified conditions influencing health status(V49.89)     Per NextGen:  Insulin dependent DM with no background diabetic retinopathy, OU.    Type 1 diabetes mellitus (HCC)       Social History:  Social History     Socioeconomic History    Marital status:    Tobacco Use    Smoking status: Former     Years: 2     Types: Cigarettes    Smokeless tobacco: Never   Vaping Use    Vaping Use: Never used   Substance and Sexual Activity    Alcohol use: Yes     Alcohol/week: 2.0 standard drinks of alcohol     Types: 2 Standard drinks or equivalent per week    Drug use: No   Other Topics Concern    Caffeine Concern Yes     Comment: Coffee, 6 - 10 cups daily        REVIEW OF  SYSTEMS:   GENERAL HEALTH: feels well otherwise  SKIN: denies any unusual skin lesions or rashes  RESPIRATORY: denies shortness of breath with exertion  CARDIOVASCULAR: denies chest pain on exertion  GI: denies abdominal pain and denies heartburn  NEURO: denies headaches    EXAM:   /76   Pulse 88   Temp 98 °F (36.7 °C)   Ht 5' 10\" (1.778 m)   Wt 234 lb 3.2 oz (106.2 kg)   SpO2 97%   BMI 33.60 kg/m²   GENERAL: well developed, well nourished,in no apparent distress  SKIN: no rashes,no suspicious lesions  HEENT: atraumatic, normocephalic,ears and throat are clear  NECK: supple,no adenopathy,no bruits  LUNGS: clear to auscultation  CARDIO: RRR without murmur  GI: good BS's,no masses, HSM or tenderness  EXTREMITIES: no cyanosis, clubbing or edema    Orthopedic examination of the left wrist per orthopedics.  At this time he has no swelling of the left wrist.    He will return for rectal prostate and genitalia examination this summer.    ASSESSMENT AND PLAN:   1. Annual physical exam  His examination is unrevealing and he has no alarm symptoms.    I have ordered preoperative laboratory testing    ADDENDUM 1/9/24: EKG, CBC and CMP are normal. MRSA is in progress at time of this dictation and will be addressed if [positive.     HE IS MEDICALLY CLEARED FOR THE PROPOSED ORTHOPEDIC SURGERY.     2. Type 1 diabetes mellitus with hyperglycemia (HCC)  Doing well on continuous insulin pump    3. Family history of colon cancer  He is up-to-date with colonoscopy having been done May 2022.      The patient indicates understanding of these issues and agrees to the plan.  The patient is asked to return in 6 months.

## 2023-12-20 ENCOUNTER — TELEPHONE (OUTPATIENT)
Dept: INTERNAL MEDICINE CLINIC | Facility: CLINIC | Age: 38
End: 2023-12-20

## 2023-12-20 NOTE — TELEPHONE ENCOUNTER
Patient pending EKG and lab work, paperwork placed in Dr. Darnell Alanis pre-op folder. Detail Level: Detailed Detail Level: Generalized Detail Level: Zone

## 2023-12-20 NOTE — TELEPHONE ENCOUNTER
[facility] faxed over medical clearance paperwork to be completed.   Medical clearance appt was on 12/19/23    Paperwork placed in Dr Mabel Pate

## 2024-01-03 ENCOUNTER — TELEPHONE (OUTPATIENT)
Dept: INTERNAL MEDICINE CLINIC | Facility: CLINIC | Age: 39
End: 2024-01-03

## 2024-01-03 NOTE — TELEPHONE ENCOUNTER
Please call Moy.  He has not yet gotten his labs or EKG for his upcoming surgery to be done on 1/16/2024.  Please remind him to do so.    Also does he have any paperwork that I need to fax to his surgeon, Dr. Moreau.  Also, I will need to fax the results of his labs and EKG in my report to Dr. Moreau so if he could get us a fax number

## 2024-01-04 NOTE — TELEPHONE ENCOUNTER
FYI to Dr MORALES    Spoke to patient- relayed MD message. Stated he will come in on Monday for lab work and to drop off any pre-op paperwork that may be needed as well as fax number information.

## 2024-01-08 ENCOUNTER — LAB ENCOUNTER (OUTPATIENT)
Dept: LAB | Age: 39
End: 2024-01-08
Attending: INTERNAL MEDICINE
Payer: COMMERCIAL

## 2024-01-08 DIAGNOSIS — Z01.818 PRE-OP EVALUATION: ICD-10-CM

## 2024-01-08 DIAGNOSIS — Z00.00 ANNUAL PHYSICAL EXAM: ICD-10-CM

## 2024-01-08 DIAGNOSIS — E10.65 TYPE 1 DIABETES MELLITUS WITH HYPERGLYCEMIA (HCC): ICD-10-CM

## 2024-01-08 LAB
ALBUMIN SERPL-MCNC: 4.4 G/DL (ref 3.2–4.8)
ALBUMIN/GLOB SERPL: 1.4 {RATIO} (ref 1–2)
ALP LIVER SERPL-CCNC: 105 U/L
ALT SERPL-CCNC: 20 U/L
ANION GAP SERPL CALC-SCNC: 5 MMOL/L (ref 0–18)
AST SERPL-CCNC: 30 U/L (ref ?–34)
ATRIAL RATE: 62 BPM
BASOPHILS # BLD AUTO: 0.02 X10(3) UL (ref 0–0.2)
BASOPHILS NFR BLD AUTO: 0.5 %
BILIRUB SERPL-MCNC: 1.3 MG/DL (ref 0.3–1.2)
BUN BLD-MCNC: 13 MG/DL (ref 9–23)
BUN/CREAT SERPL: 14.8 (ref 10–20)
CALCIUM BLD-MCNC: 9.5 MG/DL (ref 8.7–10.4)
CHLORIDE SERPL-SCNC: 103 MMOL/L (ref 98–112)
CHOLEST SERPL-MCNC: 184 MG/DL (ref ?–200)
CO2 SERPL-SCNC: 28 MMOL/L (ref 21–32)
COMPLEXED PSA SERPL-MCNC: 0.45 NG/ML (ref ?–4)
CREAT BLD-MCNC: 0.88 MG/DL
DEPRECATED RDW RBC AUTO: 39.7 FL (ref 35.1–46.3)
EGFRCR SERPLBLD CKD-EPI 2021: 111 ML/MIN/1.73M2 (ref 60–?)
EOSINOPHIL # BLD AUTO: 0.03 X10(3) UL (ref 0–0.7)
EOSINOPHIL NFR BLD AUTO: 0.8 %
ERYTHROCYTE [DISTWIDTH] IN BLOOD BY AUTOMATED COUNT: 11.9 % (ref 11–15)
FASTING PATIENT LIPID ANSWER: YES
FASTING STATUS PATIENT QL REPORTED: YES
GLOBULIN PLAS-MCNC: 3.2 G/DL (ref 2.8–4.4)
GLUCOSE BLD-MCNC: 146 MG/DL (ref 70–99)
HCT VFR BLD AUTO: 42.8 %
HDLC SERPL-MCNC: 77 MG/DL (ref 40–59)
HGB BLD-MCNC: 14.6 G/DL
IMM GRANULOCYTES # BLD AUTO: 0 X10(3) UL (ref 0–1)
IMM GRANULOCYTES NFR BLD: 0 %
LDLC SERPL CALC-MCNC: 95 MG/DL (ref ?–100)
LYMPHOCYTES # BLD AUTO: 1.78 X10(3) UL (ref 1–4)
LYMPHOCYTES NFR BLD AUTO: 45.9 %
MCH RBC QN AUTO: 30.8 PG (ref 26–34)
MCHC RBC AUTO-ENTMCNC: 34.1 G/DL (ref 31–37)
MCV RBC AUTO: 90.3 FL
MONOCYTES # BLD AUTO: 0.3 X10(3) UL (ref 0.1–1)
MONOCYTES NFR BLD AUTO: 7.7 %
NEUTROPHILS # BLD AUTO: 1.75 X10 (3) UL (ref 1.5–7.7)
NEUTROPHILS # BLD AUTO: 1.75 X10(3) UL (ref 1.5–7.7)
NEUTROPHILS NFR BLD AUTO: 45.1 %
NONHDLC SERPL-MCNC: 107 MG/DL (ref ?–130)
OSMOLALITY SERPL CALC.SUM OF ELEC: 285 MOSM/KG (ref 275–295)
P AXIS: 56 DEGREES
P-R INTERVAL: 150 MS
PLATELET # BLD AUTO: 152 10(3)UL (ref 150–450)
POTASSIUM SERPL-SCNC: 4.1 MMOL/L (ref 3.5–5.1)
PROT SERPL-MCNC: 7.6 G/DL (ref 5.7–8.2)
Q-T INTERVAL: 388 MS
QRS DURATION: 84 MS
QTC CALCULATION (BEZET): 393 MS
R AXIS: 51 DEGREES
RBC # BLD AUTO: 4.74 X10(6)UL
SODIUM SERPL-SCNC: 136 MMOL/L (ref 136–145)
T AXIS: 32 DEGREES
TRIGL SERPL-MCNC: 65 MG/DL (ref 30–149)
TSI SER-ACNC: 1.06 MIU/ML (ref 0.55–4.78)
VENTRICULAR RATE: 62 BPM
VLDLC SERPL CALC-MCNC: 11 MG/DL (ref 0–30)
WBC # BLD AUTO: 3.9 X10(3) UL (ref 4–11)

## 2024-01-08 PROCEDURE — 87641 MR-STAPH DNA AMP PROBE: CPT

## 2024-01-08 PROCEDURE — 84443 ASSAY THYROID STIM HORMONE: CPT

## 2024-01-08 PROCEDURE — 93005 ELECTROCARDIOGRAM TRACING: CPT

## 2024-01-08 PROCEDURE — 85025 COMPLETE CBC W/AUTO DIFF WBC: CPT

## 2024-01-08 PROCEDURE — 80053 COMPREHEN METABOLIC PANEL: CPT

## 2024-01-08 PROCEDURE — 93010 ELECTROCARDIOGRAM REPORT: CPT | Performed by: INTERNAL MEDICINE

## 2024-01-08 PROCEDURE — 36415 COLL VENOUS BLD VENIPUNCTURE: CPT

## 2024-01-08 PROCEDURE — 80061 LIPID PANEL: CPT

## 2024-01-09 ENCOUNTER — TELEPHONE (OUTPATIENT)
Dept: INTERNAL MEDICINE CLINIC | Facility: CLINIC | Age: 39
End: 2024-01-09

## 2024-01-09 LAB — MRSA DNA SPEC QL NAA+PROBE: NEGATIVE

## 2024-07-01 ENCOUNTER — TELEPHONE (OUTPATIENT)
Dept: ENDOCRINOLOGY CLINIC | Facility: CLINIC | Age: 39
End: 2024-07-01

## 2024-07-01 NOTE — TELEPHONE ENCOUNTER
Patient calling regards appointment with Dr Solorzano, states cannot wait until her next available le. Patient has scheduled with Dr Hyde as follow up but advised regards being established with Dr Solorzano. Please call.

## 2024-07-03 NOTE — TELEPHONE ENCOUNTER
Last telemedicine visit with Dr. Solorzano 1/21/22 for type 1 diabetes. He also saw Bettye in 3/2021.     Returned call to the patient to discuss. LEXIE. [employer] message also sent.

## 2024-07-08 ENCOUNTER — TELEPHONE (OUTPATIENT)
Dept: INTERNAL MEDICINE CLINIC | Facility: CLINIC | Age: 39
End: 2024-07-08

## 2024-07-08 DIAGNOSIS — E10.9 TYPE 1 DIABETES MELLITUS WITHOUT COMPLICATION (HCC): Primary | ICD-10-CM

## 2024-07-08 NOTE — TELEPHONE ENCOUNTER
Orders entered per written order -called patient and relayed this message - verbalized understanding

## 2024-07-09 ENCOUNTER — LAB ENCOUNTER (OUTPATIENT)
Dept: LAB | Age: 39
End: 2024-07-09
Attending: INTERNAL MEDICINE
Payer: COMMERCIAL

## 2024-07-09 DIAGNOSIS — E10.9 TYPE 1 DIABETES MELLITUS WITHOUT COMPLICATION (HCC): ICD-10-CM

## 2024-07-09 LAB
ALBUMIN SERPL-MCNC: 4.7 G/DL (ref 3.2–4.8)
ALBUMIN/GLOB SERPL: 1.8 {RATIO} (ref 1–2)
ALP LIVER SERPL-CCNC: 98 U/L
ALT SERPL-CCNC: 28 U/L
ANION GAP SERPL CALC-SCNC: 9 MMOL/L (ref 0–18)
AST SERPL-CCNC: 32 U/L (ref ?–34)
BASOPHILS # BLD AUTO: 0.02 X10(3) UL (ref 0–0.2)
BASOPHILS NFR BLD AUTO: 0.5 %
BILIRUB SERPL-MCNC: 1.2 MG/DL (ref 0.3–1.2)
BUN BLD-MCNC: 17 MG/DL (ref 9–23)
BUN/CREAT SERPL: 19.5 (ref 10–20)
CALCIUM BLD-MCNC: 9.7 MG/DL (ref 8.7–10.4)
CHLORIDE SERPL-SCNC: 105 MMOL/L (ref 98–112)
CHOLEST SERPL-MCNC: 182 MG/DL (ref ?–200)
CO2 SERPL-SCNC: 25 MMOL/L (ref 21–32)
CREAT BLD-MCNC: 0.87 MG/DL
DEPRECATED RDW RBC AUTO: 42.5 FL (ref 35.1–46.3)
EGFRCR SERPLBLD CKD-EPI 2021: 111 ML/MIN/1.73M2 (ref 60–?)
EOSINOPHIL # BLD AUTO: 0.02 X10(3) UL (ref 0–0.7)
EOSINOPHIL NFR BLD AUTO: 0.5 %
ERYTHROCYTE [DISTWIDTH] IN BLOOD BY AUTOMATED COUNT: 12.9 % (ref 11–15)
EST. AVERAGE GLUCOSE BLD GHB EST-MCNC: 131 MG/DL (ref 68–126)
FASTING PATIENT LIPID ANSWER: YES
FASTING STATUS PATIENT QL REPORTED: YES
GLOBULIN PLAS-MCNC: 2.6 G/DL (ref 2–3.5)
GLUCOSE BLD-MCNC: 152 MG/DL (ref 70–99)
HBA1C MFR BLD: 6.2 % (ref ?–5.7)
HCT VFR BLD AUTO: 39.7 %
HDLC SERPL-MCNC: 56 MG/DL (ref 40–59)
HGB BLD-MCNC: 13.9 G/DL
IMM GRANULOCYTES # BLD AUTO: 0.01 X10(3) UL (ref 0–1)
IMM GRANULOCYTES NFR BLD: 0.3 %
LDLC SERPL CALC-MCNC: 105 MG/DL (ref ?–100)
LYMPHOCYTES # BLD AUTO: 1.52 X10(3) UL (ref 1–4)
LYMPHOCYTES NFR BLD AUTO: 39.4 %
MCH RBC QN AUTO: 32 PG (ref 26–34)
MCHC RBC AUTO-ENTMCNC: 35 G/DL (ref 31–37)
MCV RBC AUTO: 91.3 FL
MONOCYTES # BLD AUTO: 0.36 X10(3) UL (ref 0.1–1)
MONOCYTES NFR BLD AUTO: 9.3 %
NEUTROPHILS # BLD AUTO: 1.93 X10 (3) UL (ref 1.5–7.7)
NEUTROPHILS # BLD AUTO: 1.93 X10(3) UL (ref 1.5–7.7)
NEUTROPHILS NFR BLD AUTO: 50 %
NONHDLC SERPL-MCNC: 126 MG/DL (ref ?–130)
OSMOLALITY SERPL CALC.SUM OF ELEC: 293 MOSM/KG (ref 275–295)
PLATELET # BLD AUTO: 198 10(3)UL (ref 150–450)
POTASSIUM SERPL-SCNC: 5 MMOL/L (ref 3.5–5.1)
PROT SERPL-MCNC: 7.3 G/DL (ref 5.7–8.2)
RBC # BLD AUTO: 4.35 X10(6)UL
SODIUM SERPL-SCNC: 139 MMOL/L (ref 136–145)
TRIGL SERPL-MCNC: 119 MG/DL (ref 30–149)
VLDLC SERPL CALC-MCNC: 20 MG/DL (ref 0–30)
WBC # BLD AUTO: 3.9 X10(3) UL (ref 4–11)

## 2024-07-09 PROCEDURE — 36415 COLL VENOUS BLD VENIPUNCTURE: CPT

## 2024-07-09 PROCEDURE — 83036 HEMOGLOBIN GLYCOSYLATED A1C: CPT

## 2024-07-09 PROCEDURE — 80061 LIPID PANEL: CPT

## 2024-07-09 PROCEDURE — 85025 COMPLETE CBC W/AUTO DIFF WBC: CPT

## 2024-07-09 PROCEDURE — 80053 COMPREHEN METABOLIC PANEL: CPT

## 2024-07-10 ENCOUNTER — TELEPHONE (OUTPATIENT)
Dept: ENDOCRINOLOGY CLINIC | Facility: CLINIC | Age: 39
End: 2024-07-10

## 2024-07-10 ENCOUNTER — OFFICE VISIT (OUTPATIENT)
Dept: INTERNAL MEDICINE CLINIC | Facility: CLINIC | Age: 39
End: 2024-07-10

## 2024-07-10 VITALS
OXYGEN SATURATION: 97 % | SYSTOLIC BLOOD PRESSURE: 124 MMHG | HEIGHT: 70 IN | BODY MASS INDEX: 30.95 KG/M2 | HEART RATE: 73 BPM | TEMPERATURE: 98 F | DIASTOLIC BLOOD PRESSURE: 72 MMHG | WEIGHT: 216.19 LBS

## 2024-07-10 DIAGNOSIS — E10.65 TYPE 1 DIABETES MELLITUS WITH HYPERGLYCEMIA (HCC): Primary | ICD-10-CM

## 2024-07-10 DIAGNOSIS — Z80.0 FAMILY HISTORY OF COLON CANCER: ICD-10-CM

## 2024-07-10 PROCEDURE — 99214 OFFICE O/P EST MOD 30 MIN: CPT | Performed by: INTERNAL MEDICINE

## 2024-07-10 RX ORDER — TRIAMCINOLONE ACETONIDE 1 MG/G
CREAM TOPICAL 2 TIMES DAILY PRN
Qty: 60 G | Refills: 3 | Status: SHIPPED | OUTPATIENT
Start: 2024-07-10

## 2024-07-10 RX ORDER — INSULIN LISPRO 100 [IU]/ML
INJECTION, SOLUTION INTRAVENOUS; SUBCUTANEOUS
Qty: 90 ML | Refills: 3 | Status: SHIPPED | OUTPATIENT
Start: 2024-07-10

## 2024-07-10 NOTE — PROGRESS NOTES
Moy Fraire is a 39 year old male.  HPI:   He is here for check up.     He did well following left wrist surgery.     He has itching left lateral thigh.     DM - doing well, A1C 6.1     He is on a diet and exercise program and weight is down 20 lbs       SR: no chest pain or sob, no gu or gi sx.    BP Readings from Last 6 Encounters:   07/10/24 124/72   12/19/23 120/76   05/25/22 130/80   03/25/21 124/80   11/23/20 138/76   10/28/20 128/80     Wt Readings from Last 6 Encounters:   07/10/24 216 lb 3.2 oz (98.1 kg)   12/19/23 234 lb 3.2 oz (106.2 kg)   05/25/22 229 lb (103.9 kg)   03/18/22 210 lb (95.3 kg)   03/25/21 227 lb (103 kg)   11/23/20 221 lb (100.2 kg)     Current Outpatient Medications   Medication Sig Dispense Refill    insulin lispro (HUMALOG) 100 UNIT/ML Injection Solution Inject 100 units via insulin pump daily 90 mL 3    ALPRAZolam 0.25 MG Oral Tab TAKE 1 TABLET BY MOUTH 5 HOURS BEFORE FLIGHT AND THEN 1 TABLET PRIOR TO FLIGHT AS DIRECTED; TAKE 1 TABLET EVERY DAY AS NEEDED FOR ANXIETY 30 tablet 3    insulin lispro (HUMALOG) 100 UNIT/ML Injection Solution Inject 100 units subcutaneous daily via insulin pump 30 mL 2    Continuous Blood Gluc Sensor (DEXCOM G6 SENSOR) Does not apply Misc Use as directed for continuous glucose monitoring - change every 10 days. Dexcom G6 Sensor 3-Pack 9 each 0    Continuous Blood Gluc Transmit (DEXCOM G6 TRANSMITTER) Does not apply Misc Use as directed for continuous glucose monitoring - change every 3 months. 9 each 0    Continuous Blood Gluc  (DEXCOM G6 ) Does not apply Device Use as directed for continuous glucose monitoring. 1 Device 0    ACCU-CHEK FASTCLIX LANCETS Does not apply Misc CHECK SUGAR FOUR TIMES DAILY AS DIRECTED 200 each 2    ONETOUCH ULTRA BLUE In Vitro Strip CHECK BLOOD GLUCOSE 3 TIMES DAILY AS DIRECTED 200 strip 2    Glucose Blood (DOC CONTOUR NEXT TEST) In Vitro Strip Use to check blood sugar 4 times daily as directed 150 each 5     Blood Glucose Monitoring Suppl (ACCU-CHEK WALTER PLUS) W/DEVICE Does not apply Kit Use to check blood sugar as directed. 1 kit 0      Past Medical History:    Excessive blinking    Per NextGen:  \"Excessive blinking, OU.\"    Lipid screening    Per NextGen    Meibomian gland dysfunction    Per NextGen:  \"Meibomian gland dysfunction, OU.\"    Myopia OU    Per NextGen:  \"Mild Myopia, OU.\"    Other ill-defined conditions(799.89)    Per NextGen:  Heparin-Induced Platelet Antibody - positive    Other ill-defined conditions(799.89)    Per NextGen:  JULIET positive    Other specified conditions influencing health status(V49.89)    Per NextGen:  Insulin dependent DM with no background diabetic retinopathy, OU.    Type 1 diabetes mellitus (HCC)      Social History:  Social History     Socioeconomic History    Marital status:    Tobacco Use    Smoking status: Former     Types: Cigarettes    Smokeless tobacco: Never   Vaping Use    Vaping status: Never Used   Substance and Sexual Activity    Alcohol use: Yes     Alcohol/week: 2.0 standard drinks of alcohol     Types: 2 Standard drinks or equivalent per week     Comment: occasional    Drug use: No   Other Topics Concern    Caffeine Concern Yes     Comment: Coffee, 6 - 10 cups daily        REVIEW OF SYSTEMS:   GENERAL HEALTH: feels well otherwise  SKIN: denies any unusual skin lesions or rashes  RESPIRATORY: denies shortness of breath with exertion  CARDIOVASCULAR: denies chest pain on exertion  GI: denies abdominal pain and denies heartburn  NEURO: denies headaches    EXAM:   /72   Pulse 73   Temp 98.2 °F (36.8 °C)   Ht 5' 10\" (1.778 m)   Wt 216 lb 3.2 oz (98.1 kg)   SpO2 97%   BMI 31.02 kg/m²   GENERAL: well developed, well nourished,in no apparent distress  SKIN: no rashes,no suspicious lesions  HEENT: atraumatic, normocephalic,ears and throat are clear  NECK: supple,no adenopathy,no bruits  LUNGS: clear to auscultation  CARDIO: RRR without murmur  GI: good  BS's,no masses, HSM or tenderness  EXTREMITIES: no cyanosis, clubbing or edema    : no testicular masses or inguinal hernias  Rectal: no masses, guaiac neg, prostate normal      ASSESSMENT AND PLAN:   1. Type 1 diabetes mellitus with hyperglycemia (HCC)  A1C 6.0     - CT CALCIUM SCORING; Future  - Lipid Panel; Future     -  he may need statins    To see Dr Solorzano Sept.     2. Family history of colon cancer  He had colonoscopy within the last year and will get every year or two.     The patient indicates understanding of these issues and agrees to the plan.  The patient is asked to return in 6 mo - Dr Godoy

## 2024-07-10 NOTE — TELEPHONE ENCOUNTER
Patient scheuduled follow up appointment on 8/26 and asking if any orders are needed prior. Patient completed labs yesterday through his primary care physician. Please update through [employer], thanks.

## 2024-07-10 NOTE — TELEPHONE ENCOUNTER
Dr. Solorzano,  See message below - lab results for A1c, CMP, lipid and CBC in lab tab - please advise if patient should complete microalb urine or any other labs -thanks

## 2024-07-11 NOTE — TELEPHONE ENCOUNTER
Urine microalbumin ordered. Spoke to pt and notified of lab being ordered. Verbalized understanding. No other questions or concerns.

## 2024-07-14 ENCOUNTER — TELEPHONE (OUTPATIENT)
Dept: INTERNAL MEDICINE CLINIC | Facility: CLINIC | Age: 39
End: 2024-07-14

## 2024-08-14 ENCOUNTER — LAB ENCOUNTER (OUTPATIENT)
Dept: LAB | Age: 39
End: 2024-08-14
Attending: INTERNAL MEDICINE
Payer: COMMERCIAL

## 2024-08-14 DIAGNOSIS — E10.65 TYPE 1 DIABETES MELLITUS WITH HYPERGLYCEMIA (HCC): ICD-10-CM

## 2024-08-14 LAB
CHOLEST SERPL-MCNC: 180 MG/DL (ref ?–200)
CREAT UR-SCNC: 82.8 MG/DL
FASTING PATIENT LIPID ANSWER: YES
HDLC SERPL-MCNC: 64 MG/DL (ref 40–59)
LDLC SERPL CALC-MCNC: 100 MG/DL (ref ?–100)
MICROALBUMIN UR-MCNC: <0.3 MG/DL
NONHDLC SERPL-MCNC: 116 MG/DL (ref ?–130)
TRIGL SERPL-MCNC: 90 MG/DL (ref 30–149)
VLDLC SERPL CALC-MCNC: 15 MG/DL (ref 0–30)

## 2024-08-14 PROCEDURE — 82570 ASSAY OF URINE CREATININE: CPT

## 2024-08-14 PROCEDURE — 80061 LIPID PANEL: CPT

## 2024-08-14 PROCEDURE — 36415 COLL VENOUS BLD VENIPUNCTURE: CPT

## 2024-08-14 PROCEDURE — 82043 UR ALBUMIN QUANTITATIVE: CPT

## 2024-08-16 ENCOUNTER — OFFICE VISIT (OUTPATIENT)
Dept: ENDOCRINOLOGY CLINIC | Facility: CLINIC | Age: 39
End: 2024-08-16

## 2024-08-16 VITALS
HEART RATE: 75 BPM | SYSTOLIC BLOOD PRESSURE: 108 MMHG | WEIGHT: 216 LBS | HEIGHT: 70 IN | DIASTOLIC BLOOD PRESSURE: 66 MMHG | BODY MASS INDEX: 30.92 KG/M2

## 2024-08-16 DIAGNOSIS — E10.9 CONTROLLED DIABETES MELLITUS TYPE 1 WITHOUT COMPLICATIONS (HCC): Primary | ICD-10-CM

## 2024-08-16 LAB
GLUCOSE BLOOD: 139
TEST STRIP EXPIRATION DATE: NORMAL DATE
TEST STRIP LOT #: NORMAL NUMERIC

## 2024-08-16 PROCEDURE — 82947 ASSAY GLUCOSE BLOOD QUANT: CPT

## 2024-08-16 PROCEDURE — 99214 OFFICE O/P EST MOD 30 MIN: CPT

## 2024-08-16 NOTE — PROGRESS NOTES
Name: Moy Fraire  Date: 8/16/2024    Referring Physician: No ref. provider found    HISTORY OF PRESENT ILLNESS   Moy Fraire is a 39 year old male who presents for diabetes mellitus.      Has been lost to follow up with our office for > 2 years. Last saw Dr. Solorzano 1/2022.     Prior HbA, C or glycohemoglobin were 6.1% 12/2015; 9.3% 8/2017; 7.2% 8/2018; 6.0% 9/2019; 7.0% 1/2020; 5.9% 3/2021; 6.2% 7/2024      Dietary compliance: Good --> following intermittent fasting and lower CHO diet which has improved glycemic control in the last month.     Exercise: Yes- weight training and uphill walking    Polyuria/polydipsia: No  Blurred vision: No    Episodes of hypoglycemia: Occ. Mostly related to overcorrection of hyperglycemia.     Blood Glucose:  Checking 4 times per day  Dexcom CGM      Reviewed reports for 8/3/24-8/16/24  85% of glucose readings in target range   13% of glucose readings above target range   <3% of glucose readings below target range     GMI- 6.7%    Medications for DM  Tandem - Control IQ     Basal:  12A 1.10  5:30A 1.40  6P 1.60  9P 1.45    I:CR   12A 7.0  12P 5.8  4P 6.5    Active Insulin Time 3 hours (5 hours with control IQ)    Sensitivity  12A- 40-->35    Blood Glucose Target 110       REVIEW OF SYSTEMS  Eyes: Diabetic retinopathy present: No            Most recent visit to eye doctor in last 12 months: No- overdue for optho.     CV: Cardiovascular disease present: No         Hypertension present: No         Hyperlipidemia present: No         Peripheral Vascular Disease present: No    : Nephropathy present: No    Neuro: Neuropathy present: No, denies symptoms     Skin: Infection or ulceration: No    Osteoporosis: No    Thyroid disease: No      Medications:     Current Outpatient Medications:     insulin lispro (HUMALOG) 100 UNIT/ML Injection Solution, Inject 100 units via insulin pump daily, Disp: 90 mL, Rfl: 3    insulin lispro (HUMALOG) 100 UNIT/ML Injection Solution, Inject 100  units subcutaneous daily via insulin pump, Disp: 30 mL, Rfl: 2    Continuous Blood Gluc Sensor (DEXCOM G6 SENSOR) Does not apply Misc, Use as directed for continuous glucose monitoring - change every 10 days. Dexcom G6 Sensor 3-Pack, Disp: 9 each, Rfl: 0    Continuous Blood Gluc Transmit (DEXCOM G6 TRANSMITTER) Does not apply Misc, Use as directed for continuous glucose monitoring - change every 3 months., Disp: 9 each, Rfl: 0    Continuous Blood Gluc  (DEXCOM G6 ) Does not apply Device, Use as directed for continuous glucose monitoring., Disp: 1 Device, Rfl: 0    ACCU-CHEK FASTCLIX LANCETS Does not apply Misc, CHECK SUGAR FOUR TIMES DAILY AS DIRECTED, Disp: 200 each, Rfl: 2    Blood Glucose Monitoring Suppl (ACCU-CHEK WALTER PLUS) W/DEVICE Does not apply Kit, Use to check blood sugar as directed., Disp: 1 kit, Rfl: 0    triamcinolone 0.1 % External Cream, Apply topically 2 (two) times daily as needed., Disp: 60 g, Rfl: 3    ALPRAZolam 0.25 MG Oral Tab, TAKE 1 TABLET BY MOUTH 5 HOURS BEFORE FLIGHT AND THEN 1 TABLET PRIOR TO FLIGHT AS DIRECTED; TAKE 1 TABLET EVERY DAY AS NEEDED FOR ANXIETY, Disp: 30 tablet, Rfl: 3    ONETOUCH ULTRA BLUE In Vitro Strip, CHECK BLOOD GLUCOSE 3 TIMES DAILY AS DIRECTED, Disp: 200 strip, Rfl: 2    Glucose Blood (DOC CONTOUR NEXT TEST) In Vitro Strip, Use to check blood sugar 4 times daily as directed, Disp: 150 each, Rfl: 5     Allergies:   Allergies   Allergen Reactions    Heparin OTHER (SEE COMMENTS)    Sulfa Antibiotics RASH       Social History:   Social History     Socioeconomic History    Marital status:    Tobacco Use    Smoking status: Former     Types: Cigarettes    Smokeless tobacco: Never   Vaping Use    Vaping status: Never Used   Substance and Sexual Activity    Alcohol use: Yes     Alcohol/week: 2.0 standard drinks of alcohol     Types: 2 Standard drinks or equivalent per week     Comment: occasional    Drug use: No   Other Topics Concern    Caffeine  Concern Yes     Comment: Coffee, 6 - 10 cups daily       Medical History:   Past Medical History:    Excessive blinking    Per NextGen:  \"Excessive blinking, OU.\"    Lipid screening    Per NextGen    Meibomian gland dysfunction    Per NextGen:  \"Meibomian gland dysfunction, OU.\"    Myopia OU    Per NextGen:  \"Mild Myopia, OU.\"    Other ill-defined conditions(799.89)    Per NextGen:  Heparin-Induced Platelet Antibody - positive    Other ill-defined conditions(799.89)    Per NextGen:  JULIET positive    Other specified conditions influencing health status(V49.89)    Per NextGen:  Insulin dependent DM with no background diabetic retinopathy, OU.    Type 1 diabetes mellitus (HCC)       Surgical history:   Past Surgical History:   Procedure Laterality Date    Colonoscopy  7/14, 3/17    Colonoscopy N/A 3/7/2017    Procedure: COLONOSCOPY, POSSIBLE BIOPSY, POSSIBLE POLYPECTOMY 23819;  Surgeon: Mark Khan MD;  Location: [facility]    Colonoscopy  04/2019    Colonoscopy N/A 4/30/2019    Procedure: COLONOSCOPY, POSSIBLE BIOPSY, POSSIBLE POLYPECTOMY 28083;  Surgeon: Mark Khan MD;  Location: [facility]    Laparoscopic cholecystectomy  2008    Vasectomy  09/25/2020    [facility] [place] [place]      PHYSICAL EXAM  General Appearance:  alert, well developed, in no acute distress  Eyes:  normal conjunctivae, sclera., normal sclera and normal pupils  Throat/Neck: normal sound to voice.   Back: no kyphosis  Respiratory:  non-labored. no increased work of breathing.    Lymph Nodes:  No abnormal nodes noted  Skin:  normal moisture and skin texture  Hematologic:  no excessive bruising  Psychiatric:  oriented to time, self, and place  Feet: Bilateral barefoot skin diabetic exam is normal, visualized feet and the appearance is normal.  Bilateral monofilament/sensation of both feet is normal.  Pulsation pedal pulse exam of both lower legs/feet is normal as well.      ASSESSMENT/PLAN:       1. Diabetes Mellitus Type 1, Controlled  -controlled  -HgA1c- 6.2%  -Congratulated patient on well controlled levels   -Discussed importance of glycemic control to prevent complications of diabetes  -Discussed complications of diabetes include retinopathy, neuropathy, nephropathy and cardiovascular disease  -Discussed importance of SBGM  -Discussed importance of CHO counting    -Pump setting adjusted as noted above.   -Continue Tandem pump with Control IQ   -He is interested in change to Mobi- provided information today   - Will upgrade to Dexcom G7 sensors.     -Lipids 8/2024- LDL- 100. Per PCP plan is to continue with modified diet and exercise and repeat. If still elevated agreeable to statin- reviewed cardiovascular benefit of statin and guidelines today.     - no nephropathy     -Foot exam today normal   -Normotensive  -Discussed importance of optho f/u - he will schedule     RTC 6 months  8/16/24  RALPH Hamlin

## 2024-08-26 ENCOUNTER — TELEPHONE (OUTPATIENT)
Dept: INTERNAL MEDICINE CLINIC | Facility: CLINIC | Age: 39
End: 2024-08-26

## 2024-08-26 DIAGNOSIS — E78.00 HYPERCHOLESTEREMIA: Primary | ICD-10-CM

## 2024-08-26 RX ORDER — ATORVASTATIN CALCIUM 10 MG/1
10 TABLET, FILM COATED ORAL NIGHTLY
Qty: 90 TABLET | Refills: 3 | Status: SHIPPED | OUTPATIENT
Start: 2024-08-26

## 2024-08-26 NOTE — TELEPHONE ENCOUNTER
Left message to call back.   Order for Lipid Panel in 3-4 months entered  Please relay message and clarify pharmacy

## 2024-08-26 NOTE — TELEPHONE ENCOUNTER
Tell Moy lipids still not at goal for a diabetic I would like to see LDL < 100 and actually closer to 70. Would add atorvastatin 10 mg, #90, one daily, refill x 3. Repeat lipids in 3-4  months prior to his next appointment

## 2024-09-13 ENCOUNTER — HOSPITAL ENCOUNTER (OUTPATIENT)
Dept: CT IMAGING | Age: 39
Discharge: HOME | End: 2024-09-13
Attending: INTERNAL MEDICINE

## 2024-09-13 DIAGNOSIS — E10.65 TYPE 1 DIABETES MELLITUS WITH HYPERGLYCEMIA (HCC): ICD-10-CM

## 2024-09-13 NOTE — PROGRESS NOTES
Date of Service 2024    YARI DE LA CRUZ  Date of Birth 1985    Patient Age: 39 year old    PCP: René Romeo MD  [address] [place] [id number]    Heart Scan Consult  Preliminary Heart Scan Score: 0    Previous Screening  Heart Scan Completed Previously: Yes  Year of last heart scan: 3/24/2017  Score of last heart scan: 0             Risk Factors  Personal Risk Factors  Non-alterable Risk Factors: Family History (Father  at 50 yo from MI, Brother had MI around 36 yo he survived.)  Alterable Risk Factors: Abnormal Cholesterol;Diabetes      Body Mass Index  BMI 30    Blood Pressure  /66 no med.  (Normal =< 120/80,  Elevated = 120-129/ >80,  High Stage1 130-139/80-89 , Stage2 >140/>90)    Lipid Profile  Cholesterol: 180, done on 2024.  HDL Cholesterol: 64, done on 2024.  LDL Cholesterol: 100, done on 2024.  TriGlycerides 90, done on 2024.  He started on a statin after these labs were done.    Cholesterol Goals  Value   Total  =< 200   HDL  = > 45 Men = > 55 Women   LDL   =< 100   Triglycerides  =< 150       Glucose and Hemoglobin A1C  Type I on insulin.  Lab Results   Component Value Date    PGLU 134 (H) 10/21/2020     (H) 2024    A1C 6.2 (H) 2024     (Normal Fasting Glucose < 100mg/dl )    Nurse Review  Risk factor information and results reviewed with Nurse: Yes    Recommended Follow Up:  Consult your physician regarding:: Final Heart Scan Report;Discuss potential for Incidental Finding      Recommendations for Change:  Nutrition Changes: Low Saturated Fat;Increase Fiber    Cholesterol Modification (goal of therapy depends upon your risk): Decrease LDL (Lousy/Bad) Ideal <100    Exercise: Enhance Current Program    Smoking Cessation: > 1 Year Ago    Weight Management: Decrease Current Weight    Stress Management: Adopt Stress Management Techniques    Repeat Heart Scan: 5 years if Calcium Score is 0.0;Discuss with your Physician               [facility] Recommended Resources:  Recommended Resources: Upcoming Classes, Medical Services and Health Library [email or web address]            Aura HENDRICKS RN        Please Contact the Nurse Heart Line with any Questions or Concerns [phone].

## 2024-10-03 ENCOUNTER — TELEPHONE (OUTPATIENT)
Dept: INTERNAL MEDICINE CLINIC | Facility: CLINIC | Age: 39
End: 2024-10-03

## 2024-11-15 RX ORDER — PROCHLORPERAZINE 25 MG/1
SUPPOSITORY RECTAL
Qty: 1 EACH | Refills: 1 | Status: SHIPPED | OUTPATIENT
Start: 2024-11-15

## 2024-11-15 RX ORDER — PROCHLORPERAZINE 25 MG/1
SUPPOSITORY RECTAL
Qty: 9 EACH | Refills: 1 | Status: SHIPPED | OUTPATIENT
Start: 2024-11-15

## 2024-11-15 NOTE — TELEPHONE ENCOUNTER
Endocrine Refill protocol for CGM supplies     Protocol Criteria:  PASSED     If below requirement is met, send a 90-day supply with 1 refill per provider protocol.     Verify appointment with Endocrinology completed in the last 12 months or scheduled in the next 6 months     Last completed office visit:8/16/2024 Ade Purdy APRN     Next scheduled Follow up: No appointment scheduled - Called and spoke to patient, appointment scheduled for 2/3/25.

## 2024-11-20 ENCOUNTER — TELEPHONE (OUTPATIENT)
Dept: ENDOCRINOLOGY CLINIC | Facility: CLINIC | Age: 39
End: 2024-11-20

## 2024-11-20 NOTE — TELEPHONE ENCOUNTER
Received fax from [facility] requesting order for Dexcom to be signed. Placed in Dr. Solorzano folder for review.

## 2025-01-07 ENCOUNTER — TELEPHONE (OUTPATIENT)
Dept: INTERNAL MEDICINE CLINIC | Facility: CLINIC | Age: 40
End: 2025-01-07

## 2025-01-07 ENCOUNTER — OFFICE VISIT (OUTPATIENT)
Dept: INTERNAL MEDICINE CLINIC | Facility: CLINIC | Age: 40
End: 2025-01-07

## 2025-01-07 VITALS
BODY MASS INDEX: 31.78 KG/M2 | DIASTOLIC BLOOD PRESSURE: 78 MMHG | SYSTOLIC BLOOD PRESSURE: 116 MMHG | WEIGHT: 222 LBS | HEIGHT: 70 IN | TEMPERATURE: 99 F | HEART RATE: 72 BPM

## 2025-01-07 DIAGNOSIS — E55.9 VITAMIN D DEFICIENCY: ICD-10-CM

## 2025-01-07 DIAGNOSIS — E10.65 TYPE 1 DIABETES MELLITUS WITH HYPERGLYCEMIA (HCC): ICD-10-CM

## 2025-01-07 DIAGNOSIS — F48.9 TENSION: ICD-10-CM

## 2025-01-07 DIAGNOSIS — R53.83 OTHER FATIGUE: ICD-10-CM

## 2025-01-07 DIAGNOSIS — F41.0 PANIC ATTACK: ICD-10-CM

## 2025-01-07 DIAGNOSIS — Z12.5 SCREENING FOR MALIGNANT NEOPLASM OF PROSTATE: Primary | ICD-10-CM

## 2025-01-07 PROCEDURE — 3078F DIAST BP <80 MM HG: CPT | Performed by: INTERNAL MEDICINE

## 2025-01-07 PROCEDURE — 3008F BODY MASS INDEX DOCD: CPT | Performed by: INTERNAL MEDICINE

## 2025-01-07 PROCEDURE — 3074F SYST BP LT 130 MM HG: CPT | Performed by: INTERNAL MEDICINE

## 2025-01-07 PROCEDURE — 99417 PROLNG OP E/M EACH 15 MIN: CPT | Performed by: INTERNAL MEDICINE

## 2025-01-07 PROCEDURE — 99215 OFFICE O/P EST HI 40 MIN: CPT | Performed by: INTERNAL MEDICINE

## 2025-01-07 RX ORDER — ALPRAZOLAM 0.25 MG/1
TABLET ORAL
Qty: 30 TABLET | Refills: 3 | Status: SHIPPED | OUTPATIENT
Start: 2025-01-07 | End: 2025-01-07

## 2025-01-07 RX ORDER — ALPRAZOLAM 0.25 MG/1
TABLET ORAL
Qty: 30 TABLET | Refills: 3 | Status: SHIPPED | OUTPATIENT
Start: 2025-01-07

## 2025-01-07 NOTE — PROGRESS NOTES
Moy rFaire is a 39 year old male  Chief Complaint   Patient presents with    Checkup     6 month       HPI:   Moy Fraire is a 39 year old male who presents to \[facility]\"" care.    LOV w/previous PCP Dr. Romeo was 7/10/24.    Since, he has been doing well.    Denies complaints/concerns today.    Works as a  at [employer].    Lifted weights and for about a week he has pain in his R arm lateral aspect from mid arm to elbow.    Wt Readings from Last 6 Encounters:   01/07/25 222 lb (100.7 kg)   08/16/24 216 lb (98 kg)   07/10/24 216 lb 3.2 oz (98.1 kg)   12/19/23 234 lb 3.2 oz (106.2 kg)   05/25/22 229 lb (103.9 kg)   03/18/22 210 lb (95.3 kg)     Body mass index is 31.85 kg/m².     Current Outpatient Medications   Medication Sig Dispense Refill    Continuous Glucose Sensor (DEXCOM G6 SENSOR) Does not apply Misc CHANGE SENSOR EVERY 10 DAYS AS DIRECTED 9 each 1    Continuous Glucose Transmitter (DEXCOM G6 TRANSMITTER) Does not apply Misc CHANGE TRANSMITTER EVERY 90 DAYS AS DIRECTED 1 each 1    ALPRAZolam 0.25 MG Oral Tab TAKE 1 TABLET BY MOUTH 5 HOURS BEFORE FLIGHT AND THEN 1 TABLET PRIOR TO FLIGHT AS DIRECTED; TAKE 1 TABLET EVERY DAY AS NEEDED FOR ANXIETY 30 tablet 3    insulin lispro (HUMALOG) 100 UNIT/ML Injection Solution Inject 100 units subcutaneous daily via insulin pump 30 mL 2    Continuous Blood Gluc  (DEXCOM G6 ) Does not apply Device Use as directed for continuous glucose monitoring. 1 Device 0    ACCU-CHEK FASTCLIX LANCETS Does not apply Misc CHECK SUGAR FOUR TIMES DAILY AS DIRECTED 200 each 2    ONETOUCH ULTRA BLUE In Vitro Strip CHECK BLOOD GLUCOSE 3 TIMES DAILY AS DIRECTED 200 strip 2    Glucose Blood (DOC CONTOUR NEXT TEST) In Vitro Strip Use to check blood sugar 4 times daily as directed 150 each 5    Blood Glucose Monitoring Suppl (ACCU-CHEK WALTER PLUS) W/DEVICE Does not apply Kit Use to check blood sugar as directed. 1 kit 0    atorvastatin 10 MG Oral Tab  Take 1 tablet (10 mg total) by mouth nightly. (Patient not taking: Reported on 1/7/2025) 90 tablet 3      Past Medical History:    Excessive blinking    Per [facility]:  \"Excessive blinking, OU.\"    Lipid screening    Per NextGen    Meibomian gland dysfunction    Per NextGen:  \"Meibomian gland dysfunction, OU.\"    Myopia OU    Per NextGen:  \"Mild Myopia, OU.\"    Other ill-defined conditions(799.89)    Per NextGen:  Heparin-Induced Platelet Antibody - positive    Other ill-defined conditions(799.89)    Per NextGen:  JULIET positive    Other specified conditions influencing health status(V49.89)    Per NextGen:  Insulin dependent DM with no background diabetic retinopathy, OU.    Type 1 diabetes mellitus (HCC)      Past Surgical History:   Procedure Laterality Date    Colonoscopy  7/14, 3/17    Colonoscopy N/A 03/07/2017    Procedure: COLONOSCOPY, POSSIBLE BIOPSY, POSSIBLE POLYPECTOMY 96116;  Surgeon: Mark Khan MD;  Location: [facility]    Colonoscopy  04/2019    Colonoscopy N/A 04/30/2019    Procedure: COLONOSCOPY, POSSIBLE BIOPSY, POSSIBLE POLYPECTOMY 79320;  Surgeon: Mark Khan MD;  Location: [facility]    Laparoscopic cholecystectomy  2008    Other accessory      nerve decompression bell's palsy    Vasectomy  09/25/2020    [facility] [place] [place]       Family History   Problem Relation Age of Onset    High Cholesterol Mother     Cancer Father         Cancer - lymphoma    Heart Attack Father 49        Myocardial infarction     Colon Cancer Father 38    Heart Attack Brother 38    No Known Problems Brother     No Known Problems Brother     No Known Problems Brother     Glaucoma Maternal Grandmother     Macular degeneration Maternal Grandmother     No Known Problems Daughter     No Known Problems Son     No Known Problems Son     No Known Problems Son     Colon Cancer Other         Family h/o Colon Cancer    Diabetes Other         Family h/o No Diabetes      Social  History:  Social History     Socioeconomic History    Marital status:    Tobacco Use    Smoking status: Former     Types: Cigarettes    Smokeless tobacco: Never    Tobacco comments:     Quit 12 years ago, smoked for 10 years 1/2 ppd   Vaping Use    Vaping status: Never Used   Substance and Sexual Activity    Alcohol use: Yes     Alcohol/week: 2.0 standard drinks of alcohol     Types: 2 Standard drinks or equivalent per week     Comment: occasional    Drug use: No   Other Topics Concern    Caffeine Concern Yes     Comment: Coffee, 6 - 10 cups daily           REVIEW OF SYSTEMS:   GENERAL: feels well otherwise  EYES:denies blurred vision or double vision  HEENT: denies nasal congestion, sinus pain, ST, or sore throat  LUNGS: denies shortness of breath, cough or wheezing  CARDIOVASCULAR: denies chest pain or pressure or palpitations  GI: denies abdominal pain, N/V, diarrhea, constipation, hematochezia or melena  : denies nocturia or changes in stream  NEURO: denies headaches, dizziness or focal weakness  SKIN: denies lesions, rashes or wounds    EXAM:   /78   Pulse 72   Temp 98.9 °F (37.2 °C) (Oral)   Ht 5' 10\" (1.778 m)   Wt 222 lb (100.7 kg)   BMI 31.85 kg/m²     GENERAL: well developed, well nourished, in no apparent distress  HEENT: normal oropharynx without erythema or exudate, normal TM's  EYES: PERRLA, EOMI, conjunctivae pink  NECK: supple, no cervical or supraclavicular lymphadenopathy, no carotid bruits, no thyromegaly  LUNGS: clear to auscultation b/l, no w/r/r  CARDIO: RRR, normal S1S2, no m/r/g  GI: soft, NT, ND, NABS, no HSM  MSK: pain at lateral distal aspect of upper arm upon flexion of R arm, strength 5/5 active ROM intact  EXTREMITIES: no edema, +2 DP pulses bilaterally  NEURO: A&O x 3, moves all 4 extremities normally      ASSESSMENT AND PLAN:   Moy Fraire is a 39 year old male who presents to establish care.    Encounter to establish care  - CBC W Differential W Platelet [E];  Future  - Comp Metabolic Panel (14) [E]; Future  - Hemoglobin A1C [E]; Future  - TSH W Reflex To Free T4 [E]; Future  - Lipid Panel [E]; Future  - Microalb/Creat Ratio, Random Urine [E]; Future    Screening for malignant neoplasm of prostate  - PSA (Screening) [E]; Future    Other fatigue  - Testosterone Total [E]; Future  - Vitamin D [E]; Future    R arm pain  - possibly MSK strain vs tendonitis  - advised trial of advil 600 mg TID w/food x1-2 weeks maximum and avoid strenuous activity as able, if no improvement pt was advised to call, consider ortho eval    DM1  - Most recent hemoglobin A1c: 6.2% 7/9/24  - Current regimen:   - insulin pump per endo  - Complications:   - CV: atorvastatin 10 mg at bedtime - takes inconsistently   - Nephropathy: microalb/cr ratio normal   - Neuropathy: denies   - Retinopathy: no DR per pt report recently seen by optho  - Foot exam: per endo    Situational anxiety (flying)  - xanax 0.25 mg PRN rx refilled    Healthcare Maintenance  Cancer Screenings:  - Colon: colonoscopy 10/11/23 w/Dr. Khan: normal, repeat in 2 years (10/2025)  - Lung: n/a    Vaccines:  - Tdap: recommend if >10 years since prior  - Shingles: n/a  - Pneumonia: prevnar 20 recommended  - Flu: recommend yearly  - COVID-19: x3     Misc:  - Calcium score 9/13/24: 0  - PSA screen: 0.45 1/8/24    RTC in 6 months or sooner PRN.    For E/M code - 60 minutes spent reviewing performing chart review, obtaining a history, performing a physical exam, reviewing the assessment/plan, placing orders, and completing documentation.     Zamzam Godoy DO  1/7/2025  7:54 AM

## 2025-01-07 NOTE — TELEPHONE ENCOUNTER
Pharmacy called to clarify the directions on the Alprazolam.  There were 2 different directions on the script.   English

## 2025-01-07 NOTE — TELEPHONE ENCOUNTER
To Dr. Miller     Please advise     TAKE 1 TABLET BY MOUTH 5 HOURS BEFORE FLIGHT AND THEN 1 TABLET PRIOR TO FLIGHT AS DIRECTED; TAKE 1 TABLET EVERY DAY AS NEEDED FOR ANXIETY     Is medication daily prn?

## 2025-02-03 ENCOUNTER — OFFICE VISIT (OUTPATIENT)
Dept: ENDOCRINOLOGY CLINIC | Facility: CLINIC | Age: 40
End: 2025-02-03
Payer: COMMERCIAL

## 2025-02-03 VITALS
WEIGHT: 219 LBS | HEART RATE: 81 BPM | HEIGHT: 70 IN | SYSTOLIC BLOOD PRESSURE: 130 MMHG | DIASTOLIC BLOOD PRESSURE: 81 MMHG | BODY MASS INDEX: 31.35 KG/M2

## 2025-02-03 DIAGNOSIS — E10.9 CONTROLLED DIABETES MELLITUS TYPE 1 WITHOUT COMPLICATIONS (HCC): Primary | ICD-10-CM

## 2025-02-03 LAB
GLUCOSE BLOOD: 133
HEMOGLOBIN A1C: 6.2 % (ref 4.3–5.6)
TEST STRIP LOT #: NORMAL NUMERIC

## 2025-02-03 PROCEDURE — 3079F DIAST BP 80-89 MM HG: CPT

## 2025-02-03 PROCEDURE — 3008F BODY MASS INDEX DOCD: CPT

## 2025-02-03 PROCEDURE — 99213 OFFICE O/P EST LOW 20 MIN: CPT

## 2025-02-03 PROCEDURE — 3044F HG A1C LEVEL LT 7.0%: CPT

## 2025-02-03 PROCEDURE — 82947 ASSAY GLUCOSE BLOOD QUANT: CPT

## 2025-02-03 PROCEDURE — 3075F SYST BP GE 130 - 139MM HG: CPT

## 2025-02-03 PROCEDURE — 83036 HEMOGLOBIN GLYCOSYLATED A1C: CPT

## 2025-02-03 NOTE — PROGRESS NOTES
Name: Moy Fraire  Date: 2/3/25    Referring Physician: No ref. provider found    HISTORY OF PRESENT ILLNESS   Moy Fraire is a 39 year old male who presents for diabetes mellitus.      Prior HbA, C or glycohemoglobin were 6.1% 12/2015; 9.3% 8/2017; 7.2% 8/2018; 6.0% 9/2019; 7.0% 1/2020; 5.9% 3/2021; 6.2% 7/2024; 6.2%  POC today     Dietary compliance: Good --> following intermittent fasting and lower CHO diet which has improved glycemic control in the last month. Avoiding gluten. Does eat a fair amount of fruit. Avoids simple carbs and opts for complex carbs and grains which has improved glycemic control. Works as a professional .     Exercise: Yes- weight training and uphill walking. 4 days weekly - does note higher readings following exercise which improve after a few hours. Avoids bolusing for these highs since they often lead to lows later.     Polyuria/polydipsia: No  Blurred vision: No    Episodes of hypoglycemia: Occ. Mostly related to overcorrection of hyperglycemia.     Blood Glucose:  Checking 4 times per day  Dexcom CGM      Reviewed reports for dates (1/6/25-2/3/25)    73% of glucose readings in target range   23% of glucose readings above target range   <1% of glucose readings below target range     GMI- 7.0%    Medications for DM  Tandem - Control IQ     Basal:  12A 1.10  5:30A 1.40  6P 1.60  9P 1.45    I:CR   12A 7.0  12P 5.8  4P 6.5    Active Insulin Time 3 hours (5 hours with control IQ)    Sensitivity  12A- 35    Blood Glucose Target 110       REVIEW OF SYSTEMS  Eyes: Diabetic retinopathy present: Yes- mild but stable             Most recent visit to eye doctor in last 12 months: Yes- within the last 2 months- [facility] in [place].     CV: Cardiovascular disease present: No         Hypertension present: No         Hyperlipidemia present: No         Peripheral Vascular Disease present: No    : Nephropathy present: No    Neuro: Neuropathy present: No, denies symptoms      Skin: Infection or ulceration: No    Osteoporosis: No    Thyroid disease: No      Medications:     Current Outpatient Medications:     ALPRAZolam 0.25 MG Oral Tab, TAKE 1 TABLET BY MOUTH 5 HOURS BEFORE FLIGHT AND THEN 1 TABLET PRIOR TO FLIGHT AS DIRECTED, Disp: 30 tablet, Rfl: 3    Continuous Glucose Sensor (DEXCOM G6 SENSOR) Does not apply Misc, CHANGE SENSOR EVERY 10 DAYS AS DIRECTED, Disp: 9 each, Rfl: 1    Continuous Glucose Transmitter (DEXCOM G6 TRANSMITTER) Does not apply Misc, CHANGE TRANSMITTER EVERY 90 DAYS AS DIRECTED, Disp: 1 each, Rfl: 1    insulin lispro (HUMALOG) 100 UNIT/ML Injection Solution, Inject 100 units subcutaneous daily via insulin pump, Disp: 30 mL, Rfl: 2    Continuous Blood Gluc  (DEXCOM G6 ) Does not apply Device, Use as directed for continuous glucose monitoring., Disp: 1 Device, Rfl: 0    ACCU-CHEK FASTCLIX LANCETS Does not apply Misc, CHECK SUGAR FOUR TIMES DAILY AS DIRECTED, Disp: 200 each, Rfl: 2    ONETOUCH ULTRA BLUE In Vitro Strip, CHECK BLOOD GLUCOSE 3 TIMES DAILY AS DIRECTED, Disp: 200 strip, Rfl: 2    Glucose Blood (DOC CONTOUR NEXT TEST) In Vitro Strip, Use to check blood sugar 4 times daily as directed, Disp: 150 each, Rfl: 5    Blood Glucose Monitoring Suppl (ACCU-CHEK WALTER PLUS) W/DEVICE Does not apply Kit, Use to check blood sugar as directed., Disp: 1 kit, Rfl: 0    atorvastatin 10 MG Oral Tab, Take 1 tablet (10 mg total) by mouth nightly. (Patient not taking: Reported on 2/3/2025), Disp: 90 tablet, Rfl: 3     Allergies:   Allergies   Allergen Reactions    Heparin OTHER (SEE COMMENTS)    Sulfa Antibiotics RASH       Social History:   Social History     Socioeconomic History    Marital status:    Tobacco Use    Smoking status: Former     Types: Cigarettes    Smokeless tobacco: Never    Tobacco comments:     Quit 12 years ago, smoked for 10 years 1/2 ppd   Vaping Use    Vaping status: Never Used   Substance and Sexual Activity    Alcohol use:  Yes     Alcohol/week: 2.0 standard drinks of alcohol     Types: 2 Standard drinks or equivalent per week     Comment: occasional    Drug use: No   Other Topics Concern    Caffeine Concern Yes     Comment: Coffee, 6 - 10 cups daily       Medical History:   Past Medical History:    Excessive blinking    Per NextGen:  \"Excessive blinking, OU.\"    Lipid screening    Per NextGen    Meibomian gland dysfunction    Per NextGen:  \"Meibomian gland dysfunction, OU.\"    Myopia OU    Per NextGen:  \"Mild Myopia, OU.\"    Other ill-defined conditions(799.89)    Per NextGen:  Heparin-Induced Platelet Antibody - positive    Other ill-defined conditions(799.89)    Per NextGen:  JULIET positive    Other specified conditions influencing health status(V49.89)    Per NextGen:  Insulin dependent DM with no background diabetic retinopathy, OU.    Type 1 diabetes mellitus (HCC)       Surgical history:   Past Surgical History:   Procedure Laterality Date    Colonoscopy  7/14, 3/17    Colonoscopy N/A 03/07/2017    Procedure: COLONOSCOPY, POSSIBLE BIOPSY, POSSIBLE POLYPECTOMY 20313;  Surgeon: Mark Khan MD;  Location: [facility]    Colonoscopy  04/2019    Colonoscopy N/A 04/30/2019    Procedure: COLONOSCOPY, POSSIBLE BIOPSY, POSSIBLE POLYPECTOMY 22074;  Surgeon: Mark Khan MD;  Location: [facility]    Laparoscopic cholecystectomy  2008    Other accessory      nerve decompression bell's palsy    Vasectomy  09/25/2020    [facility] [place], [place]     Wrist fracture surgery Left 01/2024     Vitals:    02/03/25 0942   BP: 130/81   Pulse: 81       PHYSICAL EXAM  General Appearance:  alert, well developed, in no acute distress  Eyes:  normal conjunctivae, sclera., normal sclera and normal pupils  Throat/Neck: normal sound to voice.   Back: no kyphosis  Respiratory:  non-labored. no increased work of breathing.    Lymph Nodes:  No abnormal nodes noted  Skin:  normal moisture and skin  texture  Hematologic:  no excessive bruising  Psychiatric:  oriented to time, self, and place      ASSESSMENT/PLAN:      1. Diabetes Mellitus Type 1, Controlled  -controlled  -HgA1c- 6.2%  -Congratulated patient on well controlled levels   -Discussed importance of glycemic control to prevent complications of diabetes  -Discussed complications of diabetes include retinopathy, neuropathy, nephropathy and cardiovascular disease  -Discussed importance of SBGM  -Discussed importance of CHO counting    -Continue current pump settings   - Discussed possible adjustment of CF overnight for hyperglycemia but he will monitor pattern.    -Continue Tandem pump with Control IQ   -He is interested in change to Mobi- he is due for upgrade in March  - Will transition to Dexcom G7 at the time of pump upgrade.   - Will upgrade to Dexcom G7 sensors.     -Lipids 8/2024- LDL- 100. Per PCP plan is to continue with modified diet and exercise and repeat. If still elevated agreeable to statin- reviewed cardiovascular benefit of statin and guidelines today. Has orders for repeat lipids from PCP    - no nephropathy - has orders for repeat lab from PCP    -Foot exam 8/2024  -Normotensive  -Discussed importance of optho f/u - now UTD with optho     RTC in 6 months  2/3/25  RALPH Hamlin

## 2025-02-18 ENCOUNTER — TELEPHONE (OUTPATIENT)
Dept: ENDOCRINOLOGY CLINIC | Facility: CLINIC | Age: 40
End: 2025-02-18

## 2025-02-18 DIAGNOSIS — E10.9 CONTROLLED DIABETES MELLITUS TYPE 1 WITHOUT COMPLICATIONS (HCC): Primary | ICD-10-CM

## 2025-02-18 RX ORDER — PROCHLORPERAZINE 25 MG/1
SUPPOSITORY RECTAL
Qty: 1 EACH | Refills: 1 | Status: SHIPPED | OUTPATIENT
Start: 2025-02-18

## 2025-02-18 RX ORDER — PROCHLORPERAZINE 25 MG/1
SUPPOSITORY RECTAL
Qty: 9 EACH | Refills: 1 | Status: SHIPPED | OUTPATIENT
Start: 2025-02-18

## 2025-02-18 NOTE — TELEPHONE ENCOUNTER
Patient calling states needs script for medication to be resent to another pharmacy due to insurance coverage. Sensors and transmitter. States is out. Please call.     Jeffery Yuen [place] on file

## 2025-02-18 NOTE — TELEPHONE ENCOUNTER
Endocrine Refill protocol for CGM supplies   Dexcom, RX sent to pt's requested pharmacy. Pt notified.   Protocol Criteria:  PASSED     If below requirement is met, send a 90-day supply with 1 refill per provider protocol.     Verify appointment with Endocrinology completed in the last 12 months or scheduled in the next 6 months     Last completed office visit:2/3/2025 Ade Purdy APRN   Next scheduled Follow up:   Future Appointments   Date Time Provider Department Center   7/15/2025  9:00 AM Zamzam Godoy DO [facility]   8/5/2025 10:00 AM Ade Purdy APRN [facility]

## 2025-05-15 ENCOUNTER — TELEPHONE (OUTPATIENT)
Dept: ENDOCRINOLOGY CLINIC | Facility: CLINIC | Age: 40
End: 2025-05-15

## 2025-05-15 NOTE — TELEPHONE ENCOUNTER
Received fax from [facility] attached is a statement of medical necessity and prescription order form, form placed in provider folder for review and signature.

## 2025-07-10 DIAGNOSIS — E10.9 CONTROLLED DIABETES MELLITUS TYPE 1 WITHOUT COMPLICATIONS (HCC): ICD-10-CM

## 2025-07-10 RX ORDER — PROCHLORPERAZINE 25 MG/1
SUPPOSITORY RECTAL
Qty: 9 EACH | Refills: 1 | Status: SHIPPED | OUTPATIENT
Start: 2025-07-10

## 2025-07-10 RX ORDER — PROCHLORPERAZINE 25 MG/1
SUPPOSITORY RECTAL
Qty: 1 EACH | Refills: 0 | Status: SHIPPED | OUTPATIENT
Start: 2025-07-10

## 2025-07-10 NOTE — TELEPHONE ENCOUNTER
Endocrine Refill protocol for CGM supplies     Protocol Criteria:  PASSED Reason: N/A    If below requirement is met, send a 90-day supply with 1 refill per provider protocol.     Verify appointment with Endocrinology completed in the last 12 months or scheduled in the next 6 months     Last completed office visit:2/3/2025 Ade Purdy APRN   Last completed telemed visit: Visit date not found  Next scheduled Follow up:   Future Appointments   Date Time Provider Department [facility]   8/15/2025  9:45 AM Ade Purdy APRN [facility]

## 2025-08-15 ENCOUNTER — OFFICE VISIT (OUTPATIENT)
Dept: ENDOCRINOLOGY CLINIC | Facility: CLINIC | Age: 40
End: 2025-08-15

## 2025-08-15 VITALS
DIASTOLIC BLOOD PRESSURE: 80 MMHG | SYSTOLIC BLOOD PRESSURE: 126 MMHG | HEIGHT: 70 IN | WEIGHT: 231 LBS | BODY MASS INDEX: 33.07 KG/M2 | HEART RATE: 77 BPM

## 2025-08-15 DIAGNOSIS — E10.9 CONTROLLED DIABETES MELLITUS TYPE 1 WITHOUT COMPLICATIONS (HCC): Primary | ICD-10-CM

## 2025-08-15 LAB
GLUCOSE BLOOD: 78
HEMOGLOBIN A1C: 5.6 % (ref 4.3–5.6)
TEST STRIP LOT #: NORMAL NUMERIC

## 2025-08-15 PROCEDURE — 3074F SYST BP LT 130 MM HG: CPT

## 2025-08-15 PROCEDURE — 3044F HG A1C LEVEL LT 7.0%: CPT

## 2025-08-15 PROCEDURE — 83036 HEMOGLOBIN GLYCOSYLATED A1C: CPT

## 2025-08-15 PROCEDURE — 99214 OFFICE O/P EST MOD 30 MIN: CPT

## 2025-08-15 PROCEDURE — 3008F BODY MASS INDEX DOCD: CPT

## 2025-08-15 PROCEDURE — 3079F DIAST BP 80-89 MM HG: CPT

## 2025-08-15 PROCEDURE — 82947 ASSAY GLUCOSE BLOOD QUANT: CPT

## (undated) NOTE — LETTER
2/15/2019              [place] [place] [id number]         Dear Lakisha Yuan,    [address] records indicate that the tests ordered for you by Joyce Smith MD  have not been done.   If you have, in fact, already completed t

## (undated) NOTE — LETTER
1/17/2018              [address] [id number]         Dear Paramjit Rod,    [address] [place] [address] records indicate that the tests ordered for you by Stanley Van MD  have not been done.   If you have, in fact, already completed t